# Patient Record
Sex: FEMALE | HISPANIC OR LATINO | Employment: FULL TIME | URBAN - METROPOLITAN AREA
[De-identification: names, ages, dates, MRNs, and addresses within clinical notes are randomized per-mention and may not be internally consistent; named-entity substitution may affect disease eponyms.]

---

## 2017-05-03 ENCOUNTER — NON-PROVIDER VISIT (OUTPATIENT)
Dept: URGENT CARE | Facility: PHYSICIAN GROUP | Age: 27
End: 2017-05-03

## 2017-05-03 DIAGNOSIS — Z02.1 PRE-EMPLOYMENT DRUG SCREENING: ICD-10-CM

## 2017-05-03 PROCEDURE — 8907 PR URINE COLLECT ONLY: Performed by: PHYSICIAN ASSISTANT

## 2018-04-09 ENCOUNTER — OCCUPATIONAL MEDICINE (OUTPATIENT)
Dept: URGENT CARE | Facility: CLINIC | Age: 28
End: 2018-04-09
Payer: COMMERCIAL

## 2018-04-09 VITALS
OXYGEN SATURATION: 96 % | RESPIRATION RATE: 16 BRPM | TEMPERATURE: 98.1 F | HEART RATE: 78 BPM | WEIGHT: 165.2 LBS | BODY MASS INDEX: 29.27 KG/M2 | DIASTOLIC BLOOD PRESSURE: 76 MMHG | HEIGHT: 63 IN | SYSTOLIC BLOOD PRESSURE: 124 MMHG

## 2018-04-09 DIAGNOSIS — L24.89 IRRITANT CONTACT DERMATITIS DUE TO OTHER AGENTS: ICD-10-CM

## 2018-04-09 PROCEDURE — 99202 OFFICE O/P NEW SF 15 MIN: CPT | Mod: 29 | Performed by: NURSE PRACTITIONER

## 2018-04-09 ASSESSMENT — ENCOUNTER SYMPTOMS
DIAPHORESIS: 0
FEVER: 0
WEAKNESS: 0
CHILLS: 0

## 2018-04-09 NOTE — LETTER
"EMPLOYEE’S CLAIM FOR COMPENSATION/ REPORT OF INITIAL TREATMENT  FORM C-4    EMPLOYEE’S CLAIM - PROVIDE ALL INFORMATION REQUESTED   First Name  Dee Last Name  Otoniel Birthdate                    1990                Sex  female Claim Number   Home Address  [address] Age  27 y.o. Height  1.6 m (5' 3\") Weight  74.9 kg (165 lb 3.2 oz) [facility]     [facility] Zip  [address] Telephone  [phone] (home)    Mailing Address  [address] [facility] Zip  [address] Primary Language Spoken  English    Insurer  [employer] Third Party   [employer]   Employee's Occupation (Job Title) When Injury or Occupational Disease Occurred      Employer's Name  [employer]  Telephone  [phone]    Employer Address  [address]  [facility]  Zip  [address]    Date of Injury  4/9/2018               Hour of Injury  10:00 AM Date Employer Notified  4/9/2018 Last Day of Work after Injury or Occupational Disease  4/9/2018 Supervisor to Whom Injury Reported  Bud   Address or Location of Accident (if applicable)  [[place]]   What were you doing at the time of accident? (if applicable)  working    How did this injury or occupational disease occur? (Be specific an answer in detail. Use additional sheet if necessary)  contact with fiberfrax, gloves, etc. Rash on arms and hands and neck   If you believe that you have an occupational disease, when did you first have knowledge of the disability and it relationship to your employment?  n/a Witnesses to the Accident  Bud      Nature of Injury or Occupational Disease  Dermatitis  Part(s) of Body Injured or Affected  Hand (L), Hand (R), Lower Arm (L)    I certify that the above is true and correct to the best of my knowledge and that I have provided this information in order to obtain the benefits of [place] " [place] inclusive or [place]).  I hereby authorize any physician, chiropractor, surgeon, practitioner, or other person, any hospital, including [facility] or [facility] hospital, any medical service organization, any insurance company, or other institution or organization to release to each other, any medical or other information, including benefits paid or payable, pertinent to this injury or disease, except information relative to diagnosis, treatment and/or counseling for AIDS, psychological conditions, alcohol or controlled substances, for which I must give specific authorization.  A Photostat of this authorization shall be as valid as the original.     Date 4/9/18   [facility]   Employee’s Signature   THIS REPORT MUST BE COMPLETED AND MAILED WITHIN 3 WORKING DAYS OF TREATMENT   Place  [facility]  Name of Facility  [facility]   Date  4/9/2018 Diagnosis  (L24.89) Irritant contact dermatitis due to other agents Is there evidence the injured employee was under the influence of alcohol and/or another controlled substance at the time of accident?   Hour  10:49 AM Description of Injury or Disease  The encounter diagnosis was Irritant contact dermatitis due to other agents. No   Treatment  OTC cortisone cream TID for 1 week.  Trial work in a different work station with no direct exposure to fracking materials.  Have you advised the patient to remain off work five days or more? No   X-Ray Findings      If Yes   From Date  To Date      From information given by the employee, together with medical evidence, can you directly connect this injury or occupational disease as job incurred?  Yes If No Full Duty  Yes Modified Duty      Is additional medical care by a physician indicated?  Yes  Comments:Follow up in 1 week. If Modified Duty, Specify any Limitations / Restrictions  Trial work in a different station.   Do you  "know of any previous injury or disease contributing to this condition or occupational disease?                            No   Date  4/9/2018 Print Doctor’s Name FAYE Lee I certify the employer’s copy of  this form was mailed on:   Address  [address] Insurer’s Use Only     [facility] Zip  [address]    Provider’s Tax ID Number  [id number] Telephone  Dept: [phone]        e-MICAELA Larson   e-Signature: Dr. Bandar Elkins, Medical Director Degree  APRN        ORIGINAL-TREATING PHYSICIAN OR CHIROPRACTOR    PAGE 2-INSURER/TPA    PAGE 3-EMPLOYER    PAGE 4-EMPLOYEE             Form C-4 (rev10/07)              BRIEF DESCRIPTION OF RIGHTS AND BENEFITS  (Pursuant to NRS 616C.050)    Notice of Injury or Occupational Disease (Incident Report Form C-1): If an injury or occupational disease (OD) arises out of and in the  course of employment, you must provide written notice to your employer as soon as practicable, but no later than 7 days after the accident or  OD. Your employer shall maintain a sufficient supply of the required forms.    Claim for Compensation (Form C-4): If medical treatment is sought, the form C-4 is available at the place of initial treatment. A completed  \"Claim for Compensation\" (Form C-4) must be filed within 90 days after an accident or OD. The treating physician or chiropractor must,  within 3 working days after treatment, complete and mail to the employer, the employer's insurer and third-party , the Claim for  Compensation.    Medical Treatment: If you require medical treatment for your on-the-job injury or OD, you may be required to select a physician or  chiropractor from a list provided by your workers’ compensation insurer, if it has contracted with an Organization for Managed Care (MCO) or  Preferred Provider Organization (PPO) or providers of health care. If your employer has not entered into a contract with an MCO or " PPO, you  may select a physician or chiropractor from the Panel of Physicians and Chiropractors. Any medical costs related to your industrial injury or  OD will be paid by your insurer.    Temporary Total Disability (TTD): If your doctor has certified that you are unable to work for a period of at least 5 consecutive days, or 5  cumulative days in a 20-day period, or places restrictions on you that your employer does not accommodate, you may be entitled to TTD  compensation.    Temporary Partial Disability (TPD): If the wage you receive upon reemployment is less than the compensation for TTD to which you are  entitled, the insurer may be required to pay you TPD compensation to make up the difference. TPD can only be paid for a maximum of 24  months.    Permanent Partial Disability (PPD): When your medical condition is stable and there is an indication of a PPD as a result of your injury or  OD, within 30 days, your insurer must arrange for an evaluation by a rating physician or chiropractor to determine the degree of your PPD. The  amount of your PPD award depends on the date of injury, the results of the PPD evaluation and your age and wage.    Permanent Total Disability (PTD): If you are medically certified by a treating physician or chiropractor as permanently and totally disabled  and have been granted a PTD status by your insurer, you are entitled to receive monthly benefits not to exceed 66 2/3% of your average  monthly wage. The amount of your PTD payments is subject to reduction if you previously received a PPD award.    Vocational Rehabilitation Services: You may be eligible for vocational rehabilitation services if you are unable to return to the job due to a  permanent physical impairment or permanent restrictions as a result of your injury or occupational disease.    Transportation and Per Armando Reimbursement: You may be eligible for travel expenses and per armando associated with medical  treatment.    Reopening: You may be able to reopen your claim if your condition worsens after claim closure.    Appeal Process: If you disagree with a written determination issued by the insurer or the insurer does not respond to your request, you may  appeal to the Department of Administration, , by following the instructions contained in your determination letter. You must  appeal the determination within 70 days from the date of the determination letter at [address], [address], [place], [place]  [address], or [address], [place], [place] [address]. If you disagree with the  decision, you may appeal to the  Department of Administration, . You must file your appeal within 30 days from the date of the  decision  letter at [address], [address], [place], [place] [address], or [address], [place], [place] [address]. If you  disagree with a decision of an , you may file a petition for judicial review with the District Court. You must do so within 30  days of the Appeal Officer’s decision. You may be represented by an  at your own expense or you may contact the [facility] for possible  representation.    [place]  for Injured Workers (NAIW): If you disagree with a  decision, you may request that NAIW represent you  without charge at an  Hearing. For information regarding denial of benefits, you may contact the [facility] at: [address], [place] [address], [phone], or [address], [address], [place] [address], [phone]    To File a Complaint with the Division: If you wish to file a complaint with the  of the Division of Industrial Relations (DIR),  please contact the Workers’ Compensation Section, [address], [address], [place], [place] [address], telephone (875) [phone], or  [address], [place], [place] [address], telephone [phone]) [phone].    For assistance with Workers’ Compensation Issues: you may contact the Office of the Governor Consumer Health Assistance, [address] [place] [address], [address], [place], [place] [address], Toll Free 1-769.488.2654, Web site: http://[employer].Counts include 234 beds at the [facility].[place]., E-mail  [email or web address] include 234 beds at the [facility].[place].                                                                                                                                                                                                                                   __________________________________________________________________                                                                   ______4/9/18___________                Employee Name / Signature                                                                                                                                                       Date                                                                                                                                                                                                     D-2 (rev. 10/07)

## 2018-04-09 NOTE — LETTER
[facility]  [address], [address] [address]  Phone:  [phone] - Fax:  [phone]   [facility] Progress Report and Disability Certification  Date of Service: 4/9/2018   No Show:  No  Date / Time of Next Visit: 4/16/2018   Claim Information   Patient Name: Dee Frederick  Claim Number:     Employer: [employer]  Date of Injury: 4/9/2018     Insurer / TPA: [employer]  ID / SSN:     Occupation:   Diagnosis: The encounter diagnosis was Irritant contact dermatitis due to other agents.    Medical Information   Related to Industrial Injury? Yes    Subjective Complaints:  1st visit.  Patient reports new onset of a red, pruritic rash on her wrists, forearms, and anterior neck x 3 weeks.  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  She does wear gloves, but notes that fibers do contact the exposed forearms and neck. She wears a variety of gloves (blue, black and teal) and changes her gloves every 30 minutes for the duration of her 12 hour shift. She has tried OTC cortisone occasionally and reports that the itching and the rash is lighter and smaller when she uses the cortisone.  He does have a history of eczema.  Denies any recreational activity or second job as potential source or rash.  Another co-worker from the same department is also being seen for similar symptoms.      Objective Findings: Patient is alert, oriented, and in no acute distress.  Erythematous patchy rash on the dorsal aspect of the 1st metacarpal region of the right hand, bilateral wrists, ventral aspect of the forearms, and anterior, left side of the neck.  Rash on the right hand has a light scale.  Some excoriations on the forearms likely due to scratching.  No evidence of secondary skin infection.  No vesicles, fluctuance, purulence, or weeping.  No TTP.  Non-pruritic presently.  No other rashes  noted.     Pre-Existing Condition(s):     Assessment:   Initial Visit    Status: Additional Care Required  Comments:Follow up in 1 week.  Permanent Disability:No    Plan: Medication  Comments:OTC cortisone cream TID for 1 week.    Diagnostics:      Comments:       Disability Information   Status: Released to Full Duty    From:  4/9/2018  Through: 4/16/2018 Restrictions are:     Physical Restrictions   Sitting:    Standing:    Stooping:    Bending:      Squatting:    Walking:    Climbing:    Pushing:      Pulling:    Other:    Reaching Above Shoulder (L):   Reaching Above Shoulder (R):       Reaching Below Shoulder (L):    Reaching Below Shoulder (R):      Not to exceed Weight Limits   Carrying(hrs):   Weight Limit(lb):   Lifting(hrs):   Weight  Limit(lb):     Comments: Trial full duty work with re-location to a different work station with no direct exposure to fracking materials.      Repetitive Actions   Hands: i.e. Fine Manipulations from Grasping:     Feet: i.e. Operating Foot Controls:     Driving / Operate Machinery:     Physician Name: FAYE Lee Physician Signature: MICAELA Dunbar e-Signature: Dr. Bandar Elkins, Medical Director   Clinic Name / Location: [address], [address]  [place] [address] Clinic Phone Number: Dept: [phone]   Appointment Time: 10:45 Am Visit Start Time: 10:49 AM   Check-In Time:  10:45 Am Visit Discharge Time:  11:29 AM   Original-Treating Physician or Chiropractor    Page 2-Insurer/TPA    Page 3-Employer    Page 4-Employee

## 2018-04-16 ENCOUNTER — OCCUPATIONAL MEDICINE (OUTPATIENT)
Dept: URGENT CARE | Facility: CLINIC | Age: 28
End: 2018-04-16
Payer: COMMERCIAL

## 2018-04-16 VITALS
DIASTOLIC BLOOD PRESSURE: 64 MMHG | HEART RATE: 70 BPM | BODY MASS INDEX: 29.23 KG/M2 | TEMPERATURE: 97.6 F | WEIGHT: 165 LBS | SYSTOLIC BLOOD PRESSURE: 98 MMHG | OXYGEN SATURATION: 96 % | HEIGHT: 63 IN | RESPIRATION RATE: 16 BRPM

## 2018-04-16 DIAGNOSIS — L24.89 IRRITANT CONTACT DERMATITIS DUE TO OTHER AGENTS: ICD-10-CM

## 2018-04-16 PROCEDURE — 99213 OFFICE O/P EST LOW 20 MIN: CPT | Mod: 29 | Performed by: NURSE PRACTITIONER

## 2018-04-16 ASSESSMENT — ENCOUNTER SYMPTOMS
TINGLING: 0
SENSORY CHANGE: 0

## 2018-04-16 NOTE — PROGRESS NOTES
"Subjective:      Dee Frederick is a 27 y.o. female who presents with Rash (rash better since last visit )      2nd visit.  Patient reported red, pruritic rash on her wrists, forearms, and anterior neck x 3 weeks.  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  After initial consult last week, she was advised to trial work in a different area with no direct contact with presumed irritant.  She applied OTC cortisone and essential oil to the rash and it resolved.  She currently denies any new rash, persistent lesions, or itching.  She does have a history of eczema.  Denies any recreational activity or second job as potential source or rash.       HPI    Review of Systems   Skin: Negative for itching and rash.   Neurological: Negative for tingling and sensory change.     Medications, Allergies, and current problem list reviewed today in Epic     Objective:     BP (!) 98/64   Pulse 70   Temp 36.4 °C (97.6 °F)   Resp 16   Ht 1.6 m (5' 3\")   Wt 74.8 kg (165 lb)   SpO2 96%   BMI 29.23 kg/m²      Physical Exam    Patient is alert, oriented, and in no acute distress.  Rash previously noted on the right hand, wrists, and neck has resolved.  No new lesions.  NO evidence of poor healing or secondary bacterial infection.       Assessment/Plan:     1. Irritant contact dermatitis due to other agents    Discharged MMI.  Patient verbalized understanding of and agreed with plan of care.      "

## 2018-04-16 NOTE — LETTER
[facility]  [address] [address]  Phone:  [phone] - Fax:  [phone]   [facility] Progress Report and Disability Certification  Date of Service: 4/16/2018   No Show:  No  Date / Time of Next Visit:     Claim Information   Patient Name: Dee Frederick  Claim Number:     Employer: [employer]  Date of Injury: 4/9/2018     Insurer / TPA: [employer]  ID / SSN:     Occupation:   Diagnosis: The encounter diagnosis was Irritant contact dermatitis due to other agents.    Medical Information   Related to Industrial Injury? Yes    Subjective Complaints:  2nd visit.  Patient reported red, pruritic rash on her wrists, forearms, and anterior neck x 3 weeks.  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  After initial consult last week, she was advised to trial work in a different area with no direct contact with presumed irritant.  She applied OTC cortisone and essential oil to the rash and it resolved.  She currently denies any new rash, persistent lesions, or itching.  She does have a history of eczema.  Denies any recreational activity or second job as potential source or rash.     Objective Findings: Patient is alert, oriented, and in no acute distress.  Rash previously noted on the right hand, wrists, and neck has resolved.  No new lesions.  NO evidence of poor healing or secondary bacterial infection.   Pre-Existing Condition(s):     Assessment:   Condition Improved    Status: Discharged /  MMI  Permanent Disability:No    Plan:      Diagnostics:      Comments:       Disability Information   Status: Released to Full Duty    From:     Through:   Restrictions are:     Physical Restrictions   Sitting:    Standing:    Stooping:    Bending:      Squatting:    Walking:    Climbing:    Pushing:      Pulling:    Other:    Reaching Above Shoulder (L):   Reaching Above  Shoulder (R):       Reaching Below Shoulder (L):    Reaching Below Shoulder (R):      Not to exceed Weight Limits   Carrying(hrs):   Weight Limit(lb):   Lifting(hrs):   Weight  Limit(lb):     Comments: If patient returns to work in [employer] station and symptoms return, consider permanent re-assigment to a different department to avoid exposure to contact irritant.      Repetitive Actions   Hands: i.e. Fine Manipulations from Grasping:     Feet: i.e. Operating Foot Controls:     Driving / Operate Machinery:     Physician Name: FAYE Lee Physician Signature: MICAELA Dunbar e-Signature: Dr. Bandar Elkins, Medical Director   Clinic Name / Location: [address], [facility] [address]  [place] [address] Clinic Phone Number: Dept: [phone]   Appointment Time: 11:45 Am Visit Start Time: 11:48 AM   Check-In Time:  11:46 Am Visit Discharge Time:  11:55 AM   Original-Treating Physician or Chiropractor    Page 2-Insurer/TPA    Page 3-Employer    Page 4-Employee

## 2018-10-25 ENCOUNTER — GYNECOLOGY VISIT (OUTPATIENT)
Dept: OBGYN | Facility: CLINIC | Age: 28
End: 2018-10-25
Payer: COMMERCIAL

## 2018-10-25 VITALS
BODY MASS INDEX: 30.65 KG/M2 | DIASTOLIC BLOOD PRESSURE: 72 MMHG | HEIGHT: 63 IN | WEIGHT: 173 LBS | SYSTOLIC BLOOD PRESSURE: 114 MMHG

## 2018-10-25 DIAGNOSIS — N92.6 MISSED MENSES: ICD-10-CM

## 2018-10-25 DIAGNOSIS — Z32.01 PREGNANCY TEST-POSITIVE: ICD-10-CM

## 2018-10-25 PROCEDURE — 76830 TRANSVAGINAL US NON-OB: CPT | Performed by: OBSTETRICS & GYNECOLOGY

## 2018-10-25 PROCEDURE — 99204 OFFICE O/P NEW MOD 45 MIN: CPT | Mod: 25 | Performed by: OBSTETRICS & GYNECOLOGY

## 2018-10-25 NOTE — PROGRESS NOTES
CC: Missed menses    Dee Frederick is a 27 y.o. . who presents presents due to missed menses. LMP 18. Reports she first had a positive pregnancy test on 19 and is thus is 13w6d based off LMP. She was not using anything for birthcontrol at time of conception as she had just had the Nexplanon removed.  This is a  planned and desired pregnancy.   Reports she has been feeling tired and nauseous thus far in pregnancy. She reports nausea/vomiting, denies headache, denies dysuria, denies  vaginal bleeding/spotting, and denies contractions/cramping.    Partner: [place] - different father than prior pregnancy    Review of systems:  Pertinent positives documented in HPI and all other systems reviewed & are negative    GYN History:  Menarche @14.  Menses regular, lasting 3-4days, not particularly heavy or painful.  Last pap many years ago, no h/o abnormal pap, nor history of cone biopsy, LEEP or any other cervical, uterine or gynecologic surgery. No history of sexually transmitted diseases.  Prior birth control OCPs, depo, Nexplanon, most recently was using Nexplanon but was taken out in July.        OB History    Para Term  AB Living   1 1 1         SAB TAB Ectopic Molar Multiple Live Births                    # Outcome Date GA Lbr Basil/2nd Weight Sex Delivery Anes PTL Lv   1 Term 03/19/10 41w0d  3.884 kg (8 lb 9 oz) M Induction             History reviewed. No pertinent past medical history. Patient denies any PMH/PSH  History reviewed. No pertinent surgical history.  Social History     Social History   • Marital status: Single     Spouse name: N/A   • Number of children: N/A   • Years of education: N/A     Occupational History   • Not on file.     Social History Main Topics   • Smoking status: Never Smoker   • Smokeless tobacco: Never Used   • Alcohol use No   • Drug use: No   • Sexual activity: Yes     Other Topics Concern   • Not on file     Social History Narrative   • No narrative on file  "    History reviewed. No pertinent family history.  Allergies:   Allergies as of 10/25/2018   • (No Known Allergies)       Current Outpatient Prescriptions:   •  Prenatal Vit-Fe Fumarate-FA (PRENATAL 1+1 PO), Take  by mouth., Disp: , Rfl:       PE:    Blood pressure 114/72, height 1.6 m (5' 3\"), weight 78.5 kg (173 lb).    General: appears stated age  Head: normocephalic, non-tender  Neck: neck is supple, no thyromegaly  Abdomen: Bowel sounds positive, nondistended, soft, nontender x4, no rebound or guarding. No organomegaly. No masses. fundus not yet palpable  Female GYN: normal female external genitalia without lesions  Skin: No rashes, or ulcers or lesions seen  Psychiatric: appropriate affect, alert and oriented x3, intact judgment and insight.    Transvaginal US performed by me and per my read:    Indication: confirm dates.     Findings: araujo intrauterine pregnancy @ 15w3d by biometry.  BPD 3.4 cm  HC 11.6 cm  AC 9.6 cm  FL 1.6 cm   Fetal position variable  Placenta posterior  Positive fetal cardiac activity @ 150 BPM.  EDC by US of 4/15/19       Right ovary not seen. Left Ovary WNL.   No free fluid in the cul-de-sac.    Pregnancy exam/test positive    A/P:   27 y.o.  history on file. here for  1. Pregnancy test-positive     2. Missed menses       #Missed menses - amenorrhea due to pregnancy.  US today is not c/w LMP thus will redate based off this US giving URIAH of 4/15/19.  Discussed pregnancy with patient who is excited.    --Normal pregnancy s/s discussed  --Advised prenatal vitamins, healthy well rounded diet, adequate hydration, and continued exercise.    #Cervical cancer screening.  Last pap many years ago will need pap at NOB visit.    #Will order prenatal labs and any additional imaging/testing needed at new OB visit  #SAB precautions discussed.  #Follow up in 2-4 weeks for new OB visit.    45 minutes were spent in face-to-face patient contact with patient, greater than 50% of which was was " spent in counseling and coordination of care for her newly diagnosed pregnancy including medical and surgical options of care.    YOGESH

## 2018-11-29 ENCOUNTER — HOSPITAL ENCOUNTER (OUTPATIENT)
Facility: MEDICAL CENTER | Age: 28
End: 2018-11-29
Attending: OBSTETRICS & GYNECOLOGY
Payer: COMMERCIAL

## 2018-11-29 ENCOUNTER — INITIAL PRENATAL (OUTPATIENT)
Dept: OBGYN | Facility: CLINIC | Age: 28
End: 2018-11-29
Payer: COMMERCIAL

## 2018-11-29 DIAGNOSIS — Z34.02 ENCOUNTER FOR SUPERVISION OF NORMAL FIRST PREGNANCY IN SECOND TRIMESTER: ICD-10-CM

## 2018-11-29 PROCEDURE — 88175 CYTOPATH C/V AUTO FLUID REDO: CPT

## 2018-11-29 PROCEDURE — 87591 N.GONORRHOEAE DNA AMP PROB: CPT

## 2018-11-29 PROCEDURE — 87491 CHLMYD TRACH DNA AMP PROBE: CPT

## 2018-11-29 PROCEDURE — 59401 PR NEW OB VISIT: CPT | Performed by: OBSTETRICS & GYNECOLOGY

## 2018-11-29 PROCEDURE — 90686 IIV4 VACC NO PRSV 0.5 ML IM: CPT | Performed by: OBSTETRICS & GYNECOLOGY

## 2018-11-29 PROCEDURE — 90471 IMMUNIZATION ADMIN: CPT | Performed by: OBSTETRICS & GYNECOLOGY

## 2018-11-29 NOTE — PROGRESS NOTES
Cc: No chief complaint on file.      HPI:  The patient is a 28 y.o.  Unknown based upon first TM US. URIAH is 4/15/2019. No LMP recorded. Feeling well. Admits to fetal movement.    She presents for her new obstetric visit.    She reports fetal movement, denies vaginal bleeding, denies leakage of fluid, denies contractions.     She denies nausea/vomiting, headaches, or urinary symptoms.      OB History    Para Term  AB Living   1 1 1         SAB TAB Ectopic Molar Multiple Live Births                    # Outcome Date GA Lbr Basil/2nd Weight Sex Delivery Anes PTL Lv   1 Term 03/19/10 41w0d  3.884 kg (8 lb 9 oz) M Induction           No past medical history on file.  No past surgical history on file.  Social History     Social History   • Marital status: Single     Spouse name: N/A   • Number of children: N/A   • Years of education: N/A     Occupational History   • Not on file.     Social History Main Topics   • Smoking status: Never Smoker   • Smokeless tobacco: Never Used   • Alcohol use No   • Drug use: No   • Sexual activity: Yes     Other Topics Concern   • Not on file     Social History Narrative   • No narrative on file     No family history on file.  Allergies:   Allergies as of 2018   • (No Known Allergies)       PE:    There were no vitals taken for this visit.    General: no apparent distress, is well developed and well nourished  Head: normocephalic, atraumatic  Neck: neck is supple, non-tender, no thyromegaly, full range of motion  CVS: regular rate and rhythm, no peripheral edema  Lungs: Normal respiratory effort. Clear to auscultation bilaterally  Abdomen: Bowel sounds positive, nondistended, soft, nontender x4, no rebound or guarding.  Female GYN: normal female external genitalia without lesions  normal vagina and normal vaginal tone, normal cervix, normal adnexa without tenderness  Skin: No rashes, or ulcers or lesions seen  Psychiatric: Patient shows appropriate affect, is alert  and oriented x3, intact judgment and insight.    DATING: DUB appt US not consistent with LMP thus will use second TM US to date pregnancy. URIAH = 4/15/2019    A/P:  28 y.o.  at 20-3weeks based on second TM ultrasound.  She is here for her new obstetric visit.      1. Encounter for supervision of normal first pregnancy in second trimester       Pap smear taken today with GC/CT  Ultrasound for dates and anatomy Rx today.  Second trimester screening for Down Syndrome and neural tube defects offered. Discussed SM, CF, and Hg electrophoresis screening with patient - advised to call insurance for cost of testing. Patient agrees to get Quad screening.  New prenatal labs today.  NOB packet given with ACOG prenatal book.  Increase water intake and encouraged healthy nutrition. Encouraged moderate exercise may continue into final trimester.   Referral to MFM not needed.  Continue prenatal vitamins.  Follow-up in 4 weeks.   SAB precautions educated.  Oriented to practice model and number of expected visits in the future.  Advised to get flu vaccine during flu season

## 2018-11-29 NOTE — PROGRESS NOTES
NOB  Pt denies any leaking fluids or vaginal blood  Flu vaccine Given today    ND: [id number]  LOT#: [id number]  Expiration Date: 19    Dose: 0.5mL  Site: Right Arm    Patient educated on use and side effects of medication. Name and  verified prior to injection. Pt tolerated? YES     Verified by JUANIS MORENO    Administered by Alyssa Tate at 2:39 PM.    Patient Provided Medication: NO

## 2018-11-30 ENCOUNTER — TELEPHONE (OUTPATIENT)
Dept: OBGYN | Facility: CLINIC | Age: 28
End: 2018-11-30

## 2018-12-01 LAB
C TRACH DNA GENITAL QL NAA+PROBE: NEGATIVE
CYTOLOGY REG CYTOL: NORMAL
N GONORRHOEA DNA GENITAL QL NAA+PROBE: NEGATIVE
SPECIMEN SOURCE: NORMAL

## 2018-12-22 ENCOUNTER — HOSPITAL ENCOUNTER (OUTPATIENT)
Dept: RADIOLOGY | Facility: MEDICAL CENTER | Age: 28
End: 2018-12-22
Attending: OBSTETRICS & GYNECOLOGY
Payer: COMMERCIAL

## 2018-12-22 DIAGNOSIS — Z34.02 ENCOUNTER FOR SUPERVISION OF NORMAL FIRST PREGNANCY IN SECOND TRIMESTER: ICD-10-CM

## 2018-12-22 PROCEDURE — 76811 OB US DETAILED SNGL FETUS: CPT

## 2018-12-26 ENCOUNTER — DOCUMENTATION (OUTPATIENT)
Dept: OBGYN | Facility: MEDICAL CENTER | Age: 28
End: 2018-12-26

## 2018-12-27 ENCOUNTER — ROUTINE PRENATAL (OUTPATIENT)
Dept: OBGYN | Facility: MEDICAL CENTER | Age: 28
End: 2018-12-27
Payer: COMMERCIAL

## 2018-12-27 VITALS
BODY MASS INDEX: 33.68 KG/M2 | DIASTOLIC BLOOD PRESSURE: 68 MMHG | SYSTOLIC BLOOD PRESSURE: 108 MMHG | WEIGHT: 190.15 LBS

## 2018-12-27 DIAGNOSIS — Z34.82 ENCOUNTER FOR SUPERVISION OF OTHER NORMAL PREGNANCY IN SECOND TRIMESTER: ICD-10-CM

## 2018-12-27 PROBLEM — Z34.90 SUPERVISION OF NORMAL PREGNANCY: Status: ACTIVE | Noted: 2018-12-27

## 2018-12-27 PROCEDURE — 90040 PR PRENATAL FOLLOW UP: CPT | Performed by: OBSTETRICS & GYNECOLOGY

## 2018-12-27 NOTE — PROGRESS NOTES
S: Pt presents for routine OB follow up.  No contractions, vaginal bleeding, or leaking fluids. Good fetal movement.  Has not had her prenatal labs done yet but will do so asap. Now too late for the AFP to be done.  Questions answered.    O: /68   Wt 86.3 kg (190 lb 2.4 oz)   BMI 33.68 kg/m²   Patients' weight gain, fluid intake and exercise level discussed.  Vitals, fundal height , fetal position, and FHR reviewed on flowsheet    Lab:No results found for this or any previous visit (from the past 336 hour(s)).    Prenatal labs:  T&S: O+  Pap smear: NILM     Level II: normal anatomy, S=D  S/p flu vaccine     A/P:  28 y.o.  at 24w3d based on LMP and confirmed by fist TM US. No LMP recorded. Patient is pregnant. presents for routine obstetric follow-up.  Size equals dates and/or scan  Glucose 1 hour given today  Prenatal panel given today  Continue prenatal vitamins.  Begin kick counts at 28 weeks.  Exercise at least 30 minutes daily.  Increase water intake.  Follow-up in 4 weeks.

## 2018-12-27 NOTE — PROGRESS NOTES
Pt here today for OB follow up  Pt states no complaints  Reports +  Joaquin # [phone]  Pharmacy Confirmed.

## 2019-01-16 ENCOUNTER — HOSPITAL ENCOUNTER (OUTPATIENT)
Dept: LAB | Facility: MEDICAL CENTER | Age: 29
End: 2019-01-16
Attending: OBSTETRICS & GYNECOLOGY
Payer: COMMERCIAL

## 2019-01-16 DIAGNOSIS — Z34.82 ENCOUNTER FOR SUPERVISION OF OTHER NORMAL PREGNANCY IN SECOND TRIMESTER: ICD-10-CM

## 2019-01-16 DIAGNOSIS — Z34.02 ENCOUNTER FOR SUPERVISION OF NORMAL FIRST PREGNANCY IN SECOND TRIMESTER: ICD-10-CM

## 2019-01-16 LAB
ABO GROUP BLD: NORMAL
APPEARANCE UR: ABNORMAL
BACTERIA #/AREA URNS HPF: ABNORMAL /HPF
BASOPHILS # BLD AUTO: 0.2 % (ref 0–1.8)
BASOPHILS # BLD: 0.02 K/UL (ref 0–0.12)
BILIRUB UR QL STRIP.AUTO: NEGATIVE
BLD GP AB SCN SERPL QL: NORMAL
COLOR UR: YELLOW
EOSINOPHIL # BLD AUTO: 0.2 K/UL (ref 0–0.51)
EOSINOPHIL NFR BLD: 1.7 % (ref 0–6.9)
EPI CELLS #/AREA URNS HPF: ABNORMAL /HPF
ERYTHROCYTE [DISTWIDTH] IN BLOOD BY AUTOMATED COUNT: 43.3 FL (ref 35.9–50)
GLUCOSE 1H P 50 G GLC PO SERPL-MCNC: 165 MG/DL (ref 70–139)
GLUCOSE UR STRIP.AUTO-MCNC: NEGATIVE MG/DL
HBV SURFACE AG SER QL: NEGATIVE
HCT VFR BLD AUTO: 33.6 % (ref 37–47)
HGB BLD-MCNC: 11.5 G/DL (ref 12–16)
HIV 1+2 AB+HIV1 P24 AG SERPL QL IA: NON REACTIVE
HYALINE CASTS #/AREA URNS LPF: ABNORMAL /LPF
IMM GRANULOCYTES # BLD AUTO: 0.06 K/UL (ref 0–0.11)
IMM GRANULOCYTES NFR BLD AUTO: 0.5 % (ref 0–0.9)
KETONES UR STRIP.AUTO-MCNC: NEGATIVE MG/DL
LEUKOCYTE ESTERASE UR QL STRIP.AUTO: ABNORMAL
LYMPHOCYTES # BLD AUTO: 1.57 K/UL (ref 1–4.8)
LYMPHOCYTES NFR BLD: 13.3 % (ref 22–41)
MCH RBC QN AUTO: 33 PG (ref 27–33)
MCHC RBC AUTO-ENTMCNC: 34.2 G/DL (ref 33.6–35)
MCV RBC AUTO: 96.3 FL (ref 81.4–97.8)
MICRO URNS: ABNORMAL
MONOCYTES # BLD AUTO: 0.69 K/UL (ref 0–0.85)
MONOCYTES NFR BLD AUTO: 5.8 % (ref 0–13.4)
NEUTROPHILS # BLD AUTO: 9.3 K/UL (ref 2–7.15)
NEUTROPHILS NFR BLD: 78.5 % (ref 44–72)
NITRITE UR QL STRIP.AUTO: NEGATIVE
NRBC # BLD AUTO: 0 K/UL
NRBC BLD-RTO: 0 /100 WBC
PH UR STRIP.AUTO: 6 [PH]
PLATELET # BLD AUTO: 311 K/UL (ref 164–446)
PMV BLD AUTO: 10.3 FL (ref 9–12.9)
PROT UR QL STRIP: NEGATIVE MG/DL
RBC # BLD AUTO: 3.49 M/UL (ref 4.2–5.4)
RBC # URNS HPF: ABNORMAL /HPF
RBC UR QL AUTO: NEGATIVE
RH BLD: NORMAL
RUBV AB SER QL: 266 IU/ML
SP GR UR STRIP.AUTO: 1.02
TREPONEMA PALLIDUM IGG+IGM AB [PRESENCE] IN SERUM OR PLASMA BY IMMUNOASSAY: NON REACTIVE
TREPONEMA PALLIDUM IGG+IGM AB [PRESENCE] IN SERUM OR PLASMA BY IMMUNOASSAY: NON REACTIVE
UROBILINOGEN UR STRIP.AUTO-MCNC: 0.2 MG/DL
WBC # BLD AUTO: 11.8 K/UL (ref 4.8–10.8)
WBC #/AREA URNS HPF: ABNORMAL /HPF

## 2019-01-16 PROCEDURE — 36415 COLL VENOUS BLD VENIPUNCTURE: CPT

## 2019-01-16 PROCEDURE — 86850 RBC ANTIBODY SCREEN: CPT

## 2019-01-16 PROCEDURE — 86901 BLOOD TYPING SEROLOGIC RH(D): CPT

## 2019-01-16 PROCEDURE — 87340 HEPATITIS B SURFACE AG IA: CPT

## 2019-01-16 PROCEDURE — 81511 FTL CGEN ABNOR FOUR ANAL: CPT

## 2019-01-16 PROCEDURE — 87389 HIV-1 AG W/HIV-1&-2 AB AG IA: CPT

## 2019-01-16 PROCEDURE — 86900 BLOOD TYPING SEROLOGIC ABO: CPT

## 2019-01-16 PROCEDURE — 86780 TREPONEMA PALLIDUM: CPT

## 2019-01-16 PROCEDURE — 86762 RUBELLA ANTIBODY: CPT

## 2019-01-16 PROCEDURE — 85025 COMPLETE CBC W/AUTO DIFF WBC: CPT

## 2019-01-16 PROCEDURE — 81001 URINALYSIS AUTO W/SCOPE: CPT

## 2019-01-16 PROCEDURE — 82950 GLUCOSE TEST: CPT

## 2019-01-18 DIAGNOSIS — O09.93 ENCOUNTER FOR SUPERVISION OF HIGH RISK PREGNANCY IN THIRD TRIMESTER, ANTEPARTUM: ICD-10-CM

## 2019-01-20 LAB
# FETUSES US: NORMAL
AFP MOM SERPL: NORMAL
AFP SERPL-MCNC: 169 NG/ML
AGE - REPORTED: 27.4 YR
CURRENT SMOKER: NO
FAMILY MEMBER DISEASES HX: NO
GA METHOD: NORMAL
GA: NORMAL WK
HCG MOM SERPL: NORMAL
HCG SERPL-ACNC: NORMAL IU/L
HX OF HEREDITARY DISORDERS: NO
IDDM PATIENT QL: NO
INHIBIN A MOM SERPL: NORMAL
INHIBIN A SERPL-MCNC: 306 PG/ML
INTEGRATED SCN PATIENT-IMP: NORMAL
PATHOLOGY STUDY: NORMAL
SPECIMEN DRAWN SERPL: NORMAL
U ESTRIOL MOM SERPL: NORMAL
U ESTRIOL SERPL-MCNC: 3.12 NG/ML

## 2019-01-24 ENCOUNTER — ROUTINE PRENATAL (OUTPATIENT)
Dept: OBGYN | Facility: MEDICAL CENTER | Age: 29
End: 2019-01-24
Payer: COMMERCIAL

## 2019-01-24 VITALS — BODY MASS INDEX: 34.37 KG/M2 | DIASTOLIC BLOOD PRESSURE: 70 MMHG | SYSTOLIC BLOOD PRESSURE: 105 MMHG | WEIGHT: 194 LBS

## 2019-01-24 DIAGNOSIS — Z34.80 SUPERVISION OF OTHER NORMAL PREGNANCY, ANTEPARTUM: ICD-10-CM

## 2019-01-24 PROCEDURE — 90715 TDAP VACCINE 7 YRS/> IM: CPT | Performed by: OBSTETRICS & GYNECOLOGY

## 2019-01-24 PROCEDURE — 90040 PR PRENATAL FOLLOW UP: CPT | Performed by: OBSTETRICS & GYNECOLOGY

## 2019-01-24 PROCEDURE — 90471 IMMUNIZATION ADMIN: CPT | Performed by: OBSTETRICS & GYNECOLOGY

## 2019-01-24 NOTE — PROGRESS NOTES
Ob visit @ 28w3d.Doing well with active FM.Pt denies bleeding,leaking fluid, uc's or other c/o. Active FM. Pt is advised of elevated 1hr gtt she will be given lab slip for 3hr gtt.

## 2019-01-24 NOTE — PROGRESS NOTES
S: Pt presents for routine OB follow up.  No contractions, vaginal bleeding, or leaking fluids. Good fetal movement.    Questions answered.    O: There were no vitals taken for this visit.  Patients' weight gain, fluid intake and exercise level discussed.  Vitals, fundal height , fetal position, and FHR reviewed on flowsheet    SVE: deferred  FH: 135  Fundus: 29    Lab:  Recent Results (from the past 336 hour(s))   AFP TETRA    Collection Time: 01/16/19  1:04 PM   Result Value Ref Range    AFP Value -Eia 169 ng/mL    AFP MOM Value Not Done     Ue3 Value 3.12 ng/mL    Ue3 Mom Not Done     Patient's hCG, 2nd Trimester 62233 IU/L    hCG MoM, 2nd Trimester Not Done     Lynn Value -Eia 306 pg/mL    Lynn Mom Value Not Done     Interpretation See Note     [age] URIAH [age]    Maternal Weight 173.0 lbs.     Gest. Age on Collection Date 27 wks, 2 days     Gestational Age Based On Ultrasound     Multiple Pregnancy Ivan     Race Nonblack     Insulin Dependent Diabetes No     Smoking No     Family Hx NTD No     Family Hx of Aneuploidy No     Specimen See Note     EER Quad, Maternal Serum See Note    GLUCOSE 1HR GESTATIONAL    Collection Time: 01/16/19  1:04 PM   Result Value Ref Range    Glucose, Post Dose 165 (H) 70 - 139 mg/dL   T.PALLIDUM AB EIA    Collection Time: 01/16/19  1:04 PM   Result Value Ref Range    Syphilis, Treponemal Qual Non Reactive Non Reactive   PRENATAL PANEL 3+HIV+UACXI    Collection Time: 01/16/19  1:04 PM   Result Value Ref Range    Color Yellow     Character Cloudy (A)     Specific Gravity 1.017 <1.035    Ph 6.0 5.0 - 8.0    Glucose Negative Negative mg/dL    Ketones Negative Negative mg/dL    Protein Negative Negative mg/dL    Bilirubin Negative Negative    Urobilinogen, Urine 0.2 Negative    Nitrite Negative Negative    Leukocyte Esterase Trace (A) Negative    Occult Blood Negative Negative    Micro Urine Req Microscopic     WBC 11.8 (H) 4.8 - 10.8 K/uL    RBC 3.49 (L) 4.20 - 5.40 M/uL     Hemoglobin 11.5 (L) 12.0 - 16.0 g/dL    Hematocrit 33.6 (L) 37.0 - 47.0 %    MCV 96.3 81.4 - 97.8 fL    MCH 33.0 27.0 - 33.0 pg    MCHC 34.2 33.6 - 35.0 g/dL    RDW 43.3 35.9 - 50.0 fL    Platelet Count 311 164 - 446 K/uL    MPV 10.3 9.0 - 12.9 fL    Neutrophils-Polys 78.50 (H) 44.00 - 72.00 %    Lymphocytes 13.30 (L) 22.00 - 41.00 %    Monocytes 5.80 0.00 - 13.40 %    Eosinophils 1.70 0.00 - 6.90 %    Basophils 0.20 0.00 - 1.80 %    Immature Granulocytes 0.50 0.00 - 0.90 %    Nucleated RBC 0.00 /100 WBC    Neutrophils (Absolute) 9.30 (H) 2.00 - 7.15 K/uL    Lymphs (Absolute) 1.57 1.00 - 4.80 K/uL    Monos (Absolute) 0.69 0.00 - 0.85 K/uL    Eos (Absolute) 0.20 0.00 - 0.51 K/uL    Baso (Absolute) 0.02 0.00 - 0.12 K/uL    Immature Granulocytes (abs) 0.06 0.00 - 0.11 K/uL    NRBC (Absolute) 0.00 K/uL    Rubella IgG Antibody 266.00 IU/mL    Syphilis, Treponemal Qual Non Reactive Non Reactive    Hepatitis B Surface Antigen Negative Negative   HIV AG/AB COMBO ASSAY SCREENING    Collection Time: 19  1:04 PM   Result Value Ref Range    HIV Ag/Ab Combo Assay Non Reactive Non Reactive   OP PRENATAL PANEL-BLOOD BANK    Collection Time: 19  1:04 PM   Result Value Ref Range    ABO Grouping Only O     Rh Grouping Only POS     Antibody Screen Scrn NEG    URINE MICROSCOPIC (W/UA)    Collection Time: 19  1:04 PM   Result Value Ref Range    WBC 0-2 /hpf    RBC 0-2 /hpf    Bacteria Moderate (A) None /hpf    Epithelial Cells Moderate (A) /hpf    Hyaline Cast 0-2 /lpf       Prenatal labs:  T&S: O+/ ab neg  Hep B: negative  T.Pall: non reactive  HIV: non reactive  Pap smear: NILM neg GCCT 2018  Carrier screening:  NA  Quad: too late for testing  1 hour: 165   3 hour: not done yet    Level II: normal anatomy at 24 weeks. No previa. CL 3.9cm    A/P:  28 y.o.  at 28w3d based on 15 week US presents for routine obstetric follow-up.     - tdap today  - Delivery plan: vaginal  - Failed 1 hour: discussed  importance of getting 3 hour done  - Continue prenatal vitamins.  - Begin kick counts at 28 weeks.  - Exercise at least 30 minutes daily.  - Increase water intake.  - Follow-up in 2 weeks.

## 2019-01-31 ENCOUNTER — HOSPITAL ENCOUNTER (OUTPATIENT)
Dept: LAB | Facility: MEDICAL CENTER | Age: 29
End: 2019-01-31
Attending: OBSTETRICS & GYNECOLOGY
Payer: COMMERCIAL

## 2019-01-31 DIAGNOSIS — O09.93 ENCOUNTER FOR SUPERVISION OF HIGH RISK PREGNANCY IN THIRD TRIMESTER, ANTEPARTUM: ICD-10-CM

## 2019-01-31 LAB
GLUCOSE 1H P CHAL SERPL-MCNC: 133 MG/DL (ref 65–180)
GLUCOSE 2H P CHAL SERPL-MCNC: 135 MG/DL (ref 65–155)
GLUCOSE 3H P CHAL SERPL-MCNC: 115 MG/DL (ref 65–140)
GLUCOSE BS SERPL-MCNC: 99 MG/DL (ref 65–95)

## 2019-01-31 PROCEDURE — 36415 COLL VENOUS BLD VENIPUNCTURE: CPT

## 2019-01-31 PROCEDURE — 82951 GLUCOSE TOLERANCE TEST (GTT): CPT

## 2019-01-31 PROCEDURE — 82952 GTT-ADDED SAMPLES: CPT

## 2019-02-08 ENCOUNTER — ROUTINE PRENATAL (OUTPATIENT)
Dept: OBGYN | Facility: MEDICAL CENTER | Age: 29
End: 2019-02-08
Payer: COMMERCIAL

## 2019-02-08 VITALS — DIASTOLIC BLOOD PRESSURE: 62 MMHG | BODY MASS INDEX: 35.78 KG/M2 | SYSTOLIC BLOOD PRESSURE: 102 MMHG | WEIGHT: 202 LBS

## 2019-02-08 DIAGNOSIS — Z34.83 ENCOUNTER FOR SUPERVISION OF OTHER NORMAL PREGNANCY IN THIRD TRIMESTER: ICD-10-CM

## 2019-02-08 PROCEDURE — 90040 PR PRENATAL FOLLOW UP: CPT | Performed by: OBSTETRICS & GYNECOLOGY

## 2019-02-08 NOTE — PROGRESS NOTES
HUMBLE:  30w4d    Pt reports doing well.  Denies vaginal bleeding, contractions, LOF.  Reports +FM.    /62   Wt 91.6 kg (202 lb)   BMI 35.78 kg/m²   gen: AAO, NAD  FHTs: 130  FH: 31    A/P: 28 y.o.  @ 30w4d by 15wk US  - PNL up to date, Rh+/-, 3rd tri CBC/syphilis WNL, 1hr elevated - normal 3hr; anatomy US wnl  - s/p flu/Tdap  - discussed PP contraception, considering nexplanon vs mirena    Reviewed labor precautions (to call or come to hospital for ctx q5min, VB, LOF, decreased FM)    RTC 2wks    Radha Mcdaniel MD  [facility], Women's Health

## 2019-02-08 NOTE — PROGRESS NOTES
Pt here today for OB follow up  Pt states denies VB and LOF  Reports +FM   # verified  Pharmacy Confirmed.

## 2019-02-22 ENCOUNTER — ROUTINE PRENATAL (OUTPATIENT)
Dept: OBGYN | Facility: MEDICAL CENTER | Age: 29
End: 2019-02-22
Payer: COMMERCIAL

## 2019-02-22 VITALS — DIASTOLIC BLOOD PRESSURE: 74 MMHG | WEIGHT: 204 LBS | SYSTOLIC BLOOD PRESSURE: 125 MMHG | BODY MASS INDEX: 36.14 KG/M2

## 2019-02-22 DIAGNOSIS — Z34.83 ENCOUNTER FOR SUPERVISION OF OTHER NORMAL PREGNANCY IN THIRD TRIMESTER: ICD-10-CM

## 2019-02-22 PROCEDURE — 90040 PR PRENATAL FOLLOW UP: CPT | Performed by: OBSTETRICS & GYNECOLOGY

## 2019-02-22 NOTE — PROGRESS NOTES
Ob visit @ 32w4d.Pt doing well, active FM and no c/o today.Denies bleeding,leaking fluid, ha's or swelling. Flu and TDAP done

## 2019-02-22 NOTE — PROGRESS NOTES
S: Pt presents for routine OB follow up.  No contractions, vaginal bleeding, or leaking fluids. Good fetal movement.      O: /74   Wt 92.5 kg (204 lb)   BMI 36.14 kg/m²   Patients' weight gain, fluid intake and exercise level discussed.  Vitals, fundal height , fetal position, and FHR reviewed on flowsheet    SVE: deferred  FH: 150  Fundus: 34    Lab:No results found for this or any previous visit (from the past 336 hour(s)).    Prenatal labs:  T&S: O+/ ab neg  Hep B: negative  T.Pall: non reactive  HIV: non reactive  Pap smear: NILM neg GCCT 2018  Carrier screening:  NA  Quad: too late for testing  1 hour: 165   3 hour: 99/133/135/115 (passed)     Level II: normal anatomy at 24 weeks. No previa. CL 3.9cm    A/P:  28 y.o.  at 32w4d based on 15wk US presents for routine obstetric follow-up.   No LMP recorded. Patient is pregnant.  Size equals dates and/or scan  - s/p flu and tdap  - Delivery plan: anticipate vaginal with epidural.  - Postpartum prophylaxis: still considering nexplanon vs mirena. Wants to breastfeed  - Continue prenatal vitamins.  -  labor precautions and fetal kick counts   - Increase water intake.  - Follow-up in 2 weeks.

## 2019-03-08 ENCOUNTER — ROUTINE PRENATAL (OUTPATIENT)
Dept: OBGYN | Facility: MEDICAL CENTER | Age: 29
End: 2019-03-08
Payer: COMMERCIAL

## 2019-03-08 VITALS — BODY MASS INDEX: 36.67 KG/M2 | WEIGHT: 207 LBS | DIASTOLIC BLOOD PRESSURE: 70 MMHG | SYSTOLIC BLOOD PRESSURE: 122 MMHG

## 2019-03-08 DIAGNOSIS — O26.843 UTERINE SIZE-DATE DISCREPANCY IN THIRD TRIMESTER: ICD-10-CM

## 2019-03-08 DIAGNOSIS — Z34.83 ENCOUNTER FOR SUPERVISION OF OTHER NORMAL PREGNANCY IN THIRD TRIMESTER: Primary | ICD-10-CM

## 2019-03-08 PROCEDURE — 90040 PR PRENATAL FOLLOW UP: CPT | Performed by: NURSE PRACTITIONER

## 2019-03-08 NOTE — PROGRESS NOTES
Ob visit@ 34w4d.Pt doing well with no bleeding,leaking fluid or pain. Pt states active FM. Flu and tdap done. O+. Adequate hydration and po calories. Denies ha's, swelling or epigastric pain.

## 2019-03-08 NOTE — PROGRESS NOTES
S) Pt is a 28 y.o.   at 34w4d  gestation. Routine prenatal care today. No complaints today. Discussed GBS at next visit. Labor precautions reviewed, all questions answered.    Fetal movement Normal  Cramping no  VB no  LOF no   Denies dysuria. Generally feels well today. Good self-care activities identified. Denies headaches, swelling, visual changes, or epigastric pain .     O) Blood pressure 122/70, weight 93.9 kg (207 lb).        Labs:       PNL: WNL       GCT: 165, but 3 hour WNL        AFP: Not Examined       GBS: Next visit       Pertinent ultrasound -        18- Survey WNL, FUNMILAYO 18.0cm, c/w prev dating    A) IUP at 34w4d       S>D. US ordered for growth and FUNMILAYO. Discussed POC with patient and possible reasons for uterus to be measuring larger than expected. Exam to be done at next available         Patient Active Problem List    Diagnosis Date Noted   • Supervision of normal pregnancy 2018          SVE: deferred       Chaperone offered: n/a         TDAP: yes       FLU: yes        BTL: no       : n/a       C/S Consent: n/a       IOL or C/S scheduled: no       LAST PAP: 18- negative         P) s/s ptl vs general discomforts. Fetal movements reviewed. General ed and anticipatory guidance. Nutrition/exercise/vitamin. Plans breast Plans pp contraception- unsure, but is thinking about Nexplanon.  Continue PNV.

## 2019-03-19 ENCOUNTER — HOSPITAL ENCOUNTER (OUTPATIENT)
Dept: RADIOLOGY | Facility: MEDICAL CENTER | Age: 29
End: 2019-03-19
Attending: NURSE PRACTITIONER
Payer: COMMERCIAL

## 2019-03-19 DIAGNOSIS — O26.843 UTERINE SIZE-DATE DISCREPANCY IN THIRD TRIMESTER: ICD-10-CM

## 2019-03-19 PROCEDURE — 76816 OB US FOLLOW-UP PER FETUS: CPT

## 2019-03-21 ENCOUNTER — HOSPITAL ENCOUNTER (OUTPATIENT)
Facility: MEDICAL CENTER | Age: 29
End: 2019-03-21
Attending: OBSTETRICS & GYNECOLOGY
Payer: COMMERCIAL

## 2019-03-21 ENCOUNTER — ROUTINE PRENATAL (OUTPATIENT)
Dept: OBGYN | Facility: MEDICAL CENTER | Age: 29
End: 2019-03-21
Payer: COMMERCIAL

## 2019-03-21 VITALS — WEIGHT: 210 LBS | DIASTOLIC BLOOD PRESSURE: 60 MMHG | SYSTOLIC BLOOD PRESSURE: 100 MMHG | BODY MASS INDEX: 37.2 KG/M2

## 2019-03-21 DIAGNOSIS — Z34.83 ENCOUNTER FOR SUPERVISION OF OTHER NORMAL PREGNANCY IN THIRD TRIMESTER: ICD-10-CM

## 2019-03-21 PROCEDURE — 87081 CULTURE SCREEN ONLY: CPT

## 2019-03-21 PROCEDURE — 87150 DNA/RNA AMPLIFIED PROBE: CPT

## 2019-03-21 PROCEDURE — 90040 PR PRENATAL FOLLOW UP: CPT | Performed by: OBSTETRICS & GYNECOLOGY

## 2019-03-21 NOTE — LETTER
March 21, 2019            Dee Frederick is currently being cared for at [facility].  This patient is pregnant and please excuse from work on 3/20/2019 due to medical necessity.  .        Thank you,          Radha Mcdaniel M.D.

## 2019-03-21 NOTE — PROGRESS NOTES
HUMBLE:  36w3d    Pt reports doing well.  Denies vaginal bleeding, contractions, LOF.  Reports +FM.    /60   Wt 95.3 kg (210 lb)   BMI 37.20 kg/m²   gen: AAO, NAD  FHTs: 150  FH: 38-39  SVE: /3  Pelvic exam was chaperoned by MA (AB).    A/P: 28 y.o.  @ 36w3d by 15wk US  - PNL up to date, Rh+/-, 3rd tri CBC/syphilis WNL, 1hr elevated - normal 3hr; anatomy US wnl  - GBS today  - growth US done 3/19: EFW 3017g (69%), AGA  - pt asked today for letter excusing her from work yesterday for back pain/discomfort.  Letter given, however pt informed that we typically cannot give letters for medically necessary days off when we have not seen or evaluated the pt. Recommend continue working through delivery (pt planning on taking off starting next week).    Reviewed labor precautions (to call or come to hospital for ctx q5min, VB, LOF, decreased FM)    RTC 1wks    Radha Mcdaniel MD  [facility], Women's Health

## 2019-03-23 LAB — GP B STREP DNA SPEC QL NAA+PROBE: NEGATIVE

## 2019-03-26 PROBLEM — O26.843 UTERINE SIZE-DATE DISCREPANCY IN THIRD TRIMESTER: Status: RESOLVED | Noted: 2019-03-08 | Resolved: 2019-03-26

## 2019-03-27 ENCOUNTER — ROUTINE PRENATAL (OUTPATIENT)
Dept: OBGYN | Facility: MEDICAL CENTER | Age: 29
End: 2019-03-27
Payer: COMMERCIAL

## 2019-03-27 VITALS — BODY MASS INDEX: 37.91 KG/M2 | DIASTOLIC BLOOD PRESSURE: 70 MMHG | WEIGHT: 214 LBS | SYSTOLIC BLOOD PRESSURE: 108 MMHG

## 2019-03-27 DIAGNOSIS — Z34.83 ENCOUNTER FOR SUPERVISION OF OTHER NORMAL PREGNANCY, THIRD TRIMESTER: ICD-10-CM

## 2019-03-27 PROCEDURE — 90040 PR PRENATAL FOLLOW UP: CPT | Performed by: NURSE PRACTITIONER

## 2019-03-27 NOTE — PROGRESS NOTES
Ob visit@ 37w2 Doing well. Active FM and denies pain,bleeding or leaking fluid.GBBS negative. Pt has a small amount of vaginal irritation externally.

## 2019-03-27 NOTE — PROGRESS NOTES
SUBJECTIVE:  Pt is a 28 y.o.   at 37w2d  gestation. Presents today for follow-up prenatal care. Reports no issues at this time except for labial itching.  Reports feeling good  fetal movement. Denies cramping/contractions, bleeding or leaking of fluid. Denies dysuria, headaches, N/V. Generally feels well today. Recent ER or L&D visits - none.     OBJECTIVE:  - See prenatal vitals flow  -   Vitals:    19 1024   BP: 108/70   Weight: 97.1 kg (214 lb)      Fundal Height - 40  FHT - 150  US normal recent growth scan  Prenatal labs normal pnp, too late afp, normal 3 hour glucose. GBS neg.               ASSESSMENT:   - IUP at 37w2d    - S=D   -   Patient Active Problem List    Diagnosis Date Noted   • Supervision of normal pregnancy 2018         PLAN:  - S/sx pregnancy and labor warning signs vs general discomforts discussed  - Fetal movements and kick counts reviewed   - Adequate hydration reinforced  - Nutrition/exercise/vitamin education; continued PNV   Desires nexplanon postpartum.

## 2019-04-04 ENCOUNTER — ROUTINE PRENATAL (OUTPATIENT)
Dept: OBGYN | Facility: MEDICAL CENTER | Age: 29
End: 2019-04-04
Payer: COMMERCIAL

## 2019-04-04 VITALS — BODY MASS INDEX: 37.91 KG/M2 | DIASTOLIC BLOOD PRESSURE: 60 MMHG | SYSTOLIC BLOOD PRESSURE: 112 MMHG | WEIGHT: 214 LBS

## 2019-04-04 DIAGNOSIS — Z34.83 ENCOUNTER FOR SUPERVISION OF OTHER NORMAL PREGNANCY IN THIRD TRIMESTER: ICD-10-CM

## 2019-04-04 PROCEDURE — 90040 PR PRENATAL FOLLOW UP: CPT | Performed by: OBSTETRICS & GYNECOLOGY

## 2019-04-04 NOTE — PROGRESS NOTES
Pt here today for OB follow up  Pt states denies VB and LOF  Reports +FM  Pharmacy Confirmed.  Chaperone offered and accepted.

## 2019-04-04 NOTE — PROGRESS NOTES
28 y.o.  38w3d The patient is here for routine obstetrical followup. She reports good fetal movement. She denies contractions, vaginal bleeding, or leaking of fluid.    The patient's pregnancy is complicated by   Patient Active Problem List    Diagnosis Date Noted   • Supervision of normal pregnancy 2018     GBS neg    Followup in 1 week  Labor precautions were discussed with patient  Fetal kick counts were discussed with patient

## 2019-04-11 ENCOUNTER — ROUTINE PRENATAL (OUTPATIENT)
Dept: OBGYN | Facility: MEDICAL CENTER | Age: 29
End: 2019-04-11
Payer: COMMERCIAL

## 2019-04-11 VITALS — BODY MASS INDEX: 38.09 KG/M2 | WEIGHT: 215 LBS | SYSTOLIC BLOOD PRESSURE: 120 MMHG | DIASTOLIC BLOOD PRESSURE: 68 MMHG

## 2019-04-11 DIAGNOSIS — Z3A.39 39 WEEKS GESTATION OF PREGNANCY: ICD-10-CM

## 2019-04-11 PROCEDURE — 90040 PR PRENATAL FOLLOW UP: CPT | Performed by: OBSTETRICS & GYNECOLOGY

## 2019-04-11 NOTE — PROGRESS NOTES
S: Pt presents for routine OB follow up. Good fetal movement.  No contractions, vaginal bleeding, or leakage of fluid.    Questions answered.    Doing well overall, is considering induction of labor.    O: /68   Wt 97.5 kg (215 lb)   BMI 38.09 kg/m²   Patients' weight gain, fluid intake and exercise level discussed.  Vitals, fundal height , fetal position, and FHR reviewed on flowsheet    Lab:No results found for this or any previous visit (from the past 336 hour(s)).    A/P:  28 y.o.  at 39w3d presents for routine obstetric follow-up.  Size equals dates and/or scan    1.  Continue prenatal vitamins.  2.  Fetal kick counts.  3.  Exercise at least 30 minutes daily.  4.  Drink at least 2L of water daily  5.  Labor precautions educated.  6.  Follow-up in 1 weeks.  7.  GBS negative    Patient requests induction of labor.  She is over 39 weeks I will send a request to labor delivery.

## 2019-04-15 ENCOUNTER — HOSPITAL ENCOUNTER (INPATIENT)
Facility: MEDICAL CENTER | Age: 29
LOS: 2 days | End: 2019-04-17
Attending: OBSTETRICS & GYNECOLOGY | Admitting: OBSTETRICS & GYNECOLOGY
Payer: COMMERCIAL

## 2019-04-15 ENCOUNTER — ANESTHESIA (OUTPATIENT)
Dept: OBGYN | Facility: MEDICAL CENTER | Age: 29
End: 2019-04-15
Payer: COMMERCIAL

## 2019-04-15 ENCOUNTER — APPOINTMENT (OUTPATIENT)
Dept: OBGYN | Facility: MEDICAL CENTER | Age: 29
End: 2019-04-15
Attending: OBSTETRICS & GYNECOLOGY
Payer: COMMERCIAL

## 2019-04-15 ENCOUNTER — ANESTHESIA EVENT (OUTPATIENT)
Dept: OBGYN | Facility: MEDICAL CENTER | Age: 29
End: 2019-04-15
Payer: COMMERCIAL

## 2019-04-15 LAB
BASOPHILS # BLD AUTO: 0.2 % (ref 0–1.8)
BASOPHILS # BLD: 0.02 K/UL (ref 0–0.12)
EOSINOPHIL # BLD AUTO: 0.02 K/UL (ref 0–0.51)
EOSINOPHIL NFR BLD: 0.2 % (ref 0–6.9)
ERYTHROCYTE [DISTWIDTH] IN BLOOD BY AUTOMATED COUNT: 50 FL (ref 35.9–50)
HCT VFR BLD AUTO: 36.2 % (ref 37–47)
HGB BLD-MCNC: 12.4 G/DL (ref 12–16)
HOLDING TUBE BB 8507: NORMAL
IMM GRANULOCYTES # BLD AUTO: 0.05 K/UL (ref 0–0.11)
IMM GRANULOCYTES NFR BLD AUTO: 0.4 % (ref 0–0.9)
LYMPHOCYTES # BLD AUTO: 1.92 K/UL (ref 1–4.8)
LYMPHOCYTES NFR BLD: 15.2 % (ref 22–41)
MCH RBC QN AUTO: 32.2 PG (ref 27–33)
MCHC RBC AUTO-ENTMCNC: 34.3 G/DL (ref 33.6–35)
MCV RBC AUTO: 94 FL (ref 81.4–97.8)
MONOCYTES # BLD AUTO: 0.71 K/UL (ref 0–0.85)
MONOCYTES NFR BLD AUTO: 5.6 % (ref 0–13.4)
NEUTROPHILS # BLD AUTO: 9.93 K/UL (ref 2–7.15)
NEUTROPHILS NFR BLD: 78.4 % (ref 44–72)
NRBC # BLD AUTO: 0 K/UL
NRBC BLD-RTO: 0 /100 WBC
PLATELET # BLD AUTO: 278 K/UL (ref 164–446)
PMV BLD AUTO: 11 FL (ref 9–12.9)
RBC # BLD AUTO: 3.85 M/UL (ref 4.2–5.4)
WBC # BLD AUTO: 12.7 K/UL (ref 4.8–10.8)

## 2019-04-15 PROCEDURE — 10907ZC DRAINAGE OF AMNIOTIC FLUID, THERAPEUTIC FROM PRODUCTS OF CONCEPTION, VIA NATURAL OR ARTIFICIAL OPENING: ICD-10-PCS | Performed by: OBSTETRICS & GYNECOLOGY

## 2019-04-15 PROCEDURE — 700105 HCHG RX REV CODE 258

## 2019-04-15 PROCEDURE — 85025 COMPLETE CBC W/AUTO DIFF WBC: CPT

## 2019-04-15 PROCEDURE — 700101 HCHG RX REV CODE 250: Performed by: ANESTHESIOLOGY

## 2019-04-15 PROCEDURE — 770002 HCHG ROOM/CARE - OB PRIVATE (112)

## 2019-04-15 PROCEDURE — 59025 FETAL NON-STRESS TEST: CPT

## 2019-04-15 PROCEDURE — 700111 HCHG RX REV CODE 636 W/ 250 OVERRIDE (IP): Performed by: ANESTHESIOLOGY

## 2019-04-15 PROCEDURE — 303615 HCHG EPIDURAL/SPINAL ANESTHESIA FOR LABOR

## 2019-04-15 PROCEDURE — 700111 HCHG RX REV CODE 636 W/ 250 OVERRIDE (IP): Performed by: OBSTETRICS & GYNECOLOGY

## 2019-04-15 PROCEDURE — 0UQMXZZ REPAIR VULVA, EXTERNAL APPROACH: ICD-10-PCS | Performed by: OBSTETRICS & GYNECOLOGY

## 2019-04-15 PROCEDURE — 304965 HCHG RECOVERY SERVICES

## 2019-04-15 PROCEDURE — 700111 HCHG RX REV CODE 636 W/ 250 OVERRIDE (IP)

## 2019-04-15 PROCEDURE — 10S0XZZ REPOSITION PRODUCTS OF CONCEPTION, EXTERNAL APPROACH: ICD-10-PCS | Performed by: OBSTETRICS & GYNECOLOGY

## 2019-04-15 PROCEDURE — 59409 OBSTETRICAL CARE: CPT

## 2019-04-15 PROCEDURE — 36415 COLL VENOUS BLD VENIPUNCTURE: CPT

## 2019-04-15 PROCEDURE — 59400 OBSTETRICAL CARE: CPT | Performed by: OBSTETRICS & GYNECOLOGY

## 2019-04-15 RX ORDER — DOCUSATE SODIUM 100 MG/1
100 CAPSULE, LIQUID FILLED ORAL 2 TIMES DAILY PRN
Status: DISCONTINUED | OUTPATIENT
Start: 2019-04-15 | End: 2019-04-17 | Stop reason: HOSPADM

## 2019-04-15 RX ORDER — SODIUM CHLORIDE, SODIUM LACTATE, POTASSIUM CHLORIDE, CALCIUM CHLORIDE 600; 310; 30; 20 MG/100ML; MG/100ML; MG/100ML; MG/100ML
INJECTION, SOLUTION INTRAVENOUS
Status: COMPLETED
Start: 2019-04-15 | End: 2019-04-15

## 2019-04-15 RX ORDER — ROPIVACAINE HYDROCHLORIDE 2 MG/ML
INJECTION, SOLUTION EPIDURAL; INFILTRATION; PERINEURAL CONTINUOUS
Status: DISCONTINUED | OUTPATIENT
Start: 2019-04-15 | End: 2019-04-17 | Stop reason: HOSPADM

## 2019-04-15 RX ORDER — SODIUM CHLORIDE, SODIUM LACTATE, POTASSIUM CHLORIDE, AND CALCIUM CHLORIDE .6; .31; .03; .02 G/100ML; G/100ML; G/100ML; G/100ML
250 INJECTION, SOLUTION INTRAVENOUS PRN
Status: DISCONTINUED | OUTPATIENT
Start: 2019-04-15 | End: 2019-04-15 | Stop reason: HOSPADM

## 2019-04-15 RX ORDER — ROPIVACAINE HYDROCHLORIDE 2 MG/ML
INJECTION, SOLUTION EPIDURAL; INFILTRATION; PERINEURAL
Status: COMPLETED
Start: 2019-04-15 | End: 2019-04-15

## 2019-04-15 RX ORDER — MISOPROSTOL 200 UG/1
600 TABLET ORAL
Status: DISCONTINUED | OUTPATIENT
Start: 2019-04-15 | End: 2019-04-17 | Stop reason: HOSPADM

## 2019-04-15 RX ORDER — SODIUM CHLORIDE, SODIUM LACTATE, POTASSIUM CHLORIDE, AND CALCIUM CHLORIDE .6; .31; .03; .02 G/100ML; G/100ML; G/100ML; G/100ML
1000 INJECTION, SOLUTION INTRAVENOUS
Status: DISCONTINUED | OUTPATIENT
Start: 2019-04-15 | End: 2019-04-15 | Stop reason: HOSPADM

## 2019-04-15 RX ORDER — LIDOCAINE HCL/EPINEPHRINE/PF 2%-1:200K
VIAL (ML) INJECTION PRN
Status: DISCONTINUED | OUTPATIENT
Start: 2019-04-15 | End: 2019-04-15 | Stop reason: SURG

## 2019-04-15 RX ORDER — VITAMIN A ACETATE, BETA CAROTENE, ASCORBIC ACID, CHOLECALCIFEROL, .ALPHA.-TOCOPHEROL ACETATE, DL-, THIAMINE MONONITRATE, RIBOFLAVIN, NIACINAMIDE, PYRIDOXINE HYDROCHLORIDE, FOLIC ACID, CYANOCOBALAMIN, CALCIUM CARBONATE, FERROUS FUMARATE, ZINC OXIDE, CUPRIC OXIDE 3080; 12; 120; 400; 1; 1.84; 3; 20; 22; 920; 25; 200; 27; 10; 2 [IU]/1; UG/1; MG/1; [IU]/1; MG/1; MG/1; MG/1; MG/1; MG/1; [IU]/1; MG/1; MG/1; MG/1; MG/1; MG/1
1 TABLET, FILM COATED ORAL EVERY MORNING
Status: DISCONTINUED | OUTPATIENT
Start: 2019-04-16 | End: 2019-04-17 | Stop reason: HOSPADM

## 2019-04-15 RX ORDER — SODIUM CHLORIDE, SODIUM LACTATE, POTASSIUM CHLORIDE, CALCIUM CHLORIDE 600; 310; 30; 20 MG/100ML; MG/100ML; MG/100ML; MG/100ML
INJECTION, SOLUTION INTRAVENOUS PRN
Status: DISCONTINUED | OUTPATIENT
Start: 2019-04-15 | End: 2019-04-17 | Stop reason: HOSPADM

## 2019-04-15 RX ORDER — ROPIVACAINE HYDROCHLORIDE 2 MG/ML
INJECTION, SOLUTION EPIDURAL; INFILTRATION PRN
Status: DISCONTINUED | OUTPATIENT
Start: 2019-04-15 | End: 2019-04-15 | Stop reason: SURG

## 2019-04-15 RX ADMIN — Medication 20 UNITS: at 20:25

## 2019-04-15 RX ADMIN — ROPIVACAINE HYDROCHLORIDE 100 ML: 2 INJECTION, SOLUTION EPIDURAL; INFILTRATION; PERINEURAL at 12:13

## 2019-04-15 RX ADMIN — Medication 125 ML/HR: at 20:06

## 2019-04-15 RX ADMIN — LIDOCAINE HYDROCHLORIDE,EPINEPHRINE BITARTRATE 4 ML: 20; .005 INJECTION, SOLUTION EPIDURAL; INFILTRATION; INTRACAUDAL; PERINEURAL at 12:05

## 2019-04-15 RX ADMIN — ROPIVACAINE HYDROCHLORIDE 100 ML: 2 INJECTION, SOLUTION EPIDURAL; INFILTRATION at 12:13

## 2019-04-15 RX ADMIN — SODIUM CHLORIDE, POTASSIUM CHLORIDE, SODIUM LACTATE AND CALCIUM CHLORIDE 1000 ML: 600; 310; 30; 20 INJECTION, SOLUTION INTRAVENOUS at 11:45

## 2019-04-15 RX ADMIN — ROPIVACAINE HYDROCHLORIDE 8 ML: 2 INJECTION, SOLUTION EPIDURAL; INFILTRATION at 12:05

## 2019-04-15 ASSESSMENT — LIFESTYLE VARIABLES
ALCOHOL_USE: NO
EVER_SMOKED: NEVER

## 2019-04-15 ASSESSMENT — COPD QUESTIONNAIRES
COPD SCREENING SCORE: 0
HAVE YOU SMOKED AT LEAST 100 CIGARETTES IN YOUR ENTIRE LIFE: NO/DON'T KNOW
DO YOU EVER COUGH UP ANY MUCUS OR PHLEGM?: NO/ONLY WITH OCCASIONAL COLDS OR INFECTIONS
IN THE PAST 12 MONTHS DO YOU DO LESS THAN YOU USED TO BECAUSE OF YOUR BREATHING PROBLEMS: DISAGREE/UNSURE
DURING THE PAST 4 WEEKS HOW MUCH DID YOU FEEL SHORT OF BREATH: NONE/LITTLE OF THE TIME

## 2019-04-15 ASSESSMENT — PATIENT HEALTH QUESTIONNAIRE - PHQ9
2. FEELING DOWN, DEPRESSED, IRRITABLE, OR HOPELESS: NOT AT ALL
1. LITTLE INTEREST OR PLEASURE IN DOING THINGS: NOT AT ALL
SUM OF ALL RESPONSES TO PHQ9 QUESTIONS 1 AND 2: 0

## 2019-04-15 NOTE — PROGRESS NOTES
S: Pt is comfortable with CTX.      O:  There were no vitals filed for this visit.        FHTs:  Baseline 140, + accels, - decels, moderate variability        [place]: Contractions q 2-3 minutes, firm to palpation        SVE: 7/100/0     A/P:  1.  IUP @ 40w0d            2.  Cat 1 FHTs             3.  AROM clear             4.  Anticipate vaginal delivery           5.  Will reassess as indicated    Chelita Roper CNM, APRN

## 2019-04-15 NOTE — ANESTHESIA PREPROCEDURE EVALUATION
Relevant Problems   No relevant active problems       Physical Exam    Airway   Mallampati: II  TM distance: >3 FB  Neck ROM: full       Cardiovascular - normal exam  Rhythm: regular  Rate: normal  (-) murmur     Dental - normal exam         Pulmonary - normal exam     Abdominal    Neurological - normal exam                 Anesthesia Plan    ASA 3   ASA physical status 3 criteria: other (comment)    Plan - epidural   Neuraxial block will be primary anesthetic                  Informed Consent:

## 2019-04-15 NOTE — ANESTHESIA PROCEDURE NOTES
Epidural Block  Performed by: MARSHALL HODGSON  Authorized by: MARSHALL HODGSON     Patient Location:  OB  Start Time:  4/15/2019 12:00 PM  End Time:  4/15/2019 12:10 PM  Reason for Block: labor analgesia    patient identified, IV checked, site marked, risks and benefits discussed, surgical consent, monitors and equipment checked, pre-op evaluation and timeout performed    Patient Position:  Sitting  Prep: ChloraPrep, patient draped and sterile technique    Monitoring:  Blood pressure, continuous pulse oximetry and heart rate  Approach:  Midline  Location:  L2-L3  Injection Technique:  ANDREA saline  Skin infiltration:  Lidocaine  Strength:  1%  Dose:  3ml  Needle Type:  Tuohy  Needle Gauge:  17 G  Needle Length:  3.5 in  Loss of resistance::  7  Catheter Size:  19 G  Catheter at Skin Depth:  5  Test Dose:  Lidocaine 1.5% with epinephrine 1-to-200,000

## 2019-04-15 NOTE — H&P
History and Physical      Dee Frederick is a 28 y.o. year old female  at 40w0d who presents for CTX. Patient is scheduled for elective IOL tomorrow.     Subjective:   positive  For CTXS.   positive Feels pain   negative for LOF  negative for vaginal bleeding.   positive for fetal movement    ROS: Pertinent items are noted in HPI.    No past medical history on file.  No past surgical history on file.  OB History    Para Term  AB Living   2 1 1 0 0 1   SAB TAB Ectopic Molar Multiple Live Births   0 0 0 0 0 1      # Outcome Date GA Lbr Basil/2nd Weight Sex Delivery Anes PTL Lv   2 Current            1 Term 03/19/10 41w0d  3.884 kg (8 lb 9 oz) M Induction   ANTHONY        Social History     Social History   • Marital status: Single     Spouse name: N/A   • Number of children: N/A   • Years of education: N/A     Occupational History   • Not on file.     Social History Main Topics   • Smoking status: Never Smoker   • Smokeless tobacco: Never Used   • Alcohol use No   • Drug use: No   • Sexual activity: Yes     Other Topics Concern   • Not on file     Social History Narrative   • No narrative on file     Allergies: Patient has no known allergies.  No current facility-administered medications for this encounter.     Prenatal care with [facility] starting at 20w3d-total 10 visits with following problems:  Patient Active Problem List    Diagnosis Date Noted   • Supervision of normal pregnancy 2018         Objective:      There were no vitals taken for this visit.    General:   no acute distress   Skin:   normal   HEENT:  PERRLA   Lungs:   CTA bilateral   Heart:   S1, S2 normal, no murmur, click, rub or gallop, regular rate and rhythm, brisk carotid upstroke without bruits, peripheral pulses very brisk, chest is clear without rales or wheezing, no pedal edema, no JVD, no hepatosplenomegaly   Abdomen:   gravid, NT   EFW:  9381-8428 g   Pelvis:  Exam deferred., proven to 3884 g   FHTs: 140 BPM + accels - decels  moderate variability   Contractions: 3 contractions in 10 min mod/firm to palpation   Uterine Size: S=D   Presentations: Cephalic per RN   Cervix: Per RN    Dilation: 5cm    Effacement: 100%    Station:  -2    Consistency: Soft    Position: Posterior     Complete OB US  Fetal survey at 24 wks: normal  Limited US at 36w3d: size appropriate for age, FUNMILAYO: 18.2      Lab Review  Lab:   Blood type: O     Recent Results (from the past 5880 hour(s))   THINPREP RFLX HPV ASCUS W/CTNG    Collection Time: 11/29/18  1:23 PM   Result Value Ref Range    Cytology Reg See Path Report     Source Cervical     C. trachomatis by PCR Negative Negative    N. gonorrhoeae by PCR Negative Negative   AFP TETRA    Collection Time: 01/16/19  1:04 PM   Result Value Ref Range    AFP Value -Eia 169 ng/mL    AFP MOM Value Not Done     Ue3 Value 3.12 ng/mL    Ue3 Mom Not Done     Patient's hCG, 2nd Trimester 24773 IU/L    hCG MoM, 2nd Trimester Not Done     Lynn Value -Eia 306 pg/mL    Lynn Mom Value Not Done     Interpretation See Note     [age] URIAH [age]    Maternal Weight 173.0 lbs.     Gest. Age on Collection Date 27 wks, 2 days     Gestational Age Based On Ultrasound     Multiple Pregnancy Ivan     Race Nonblack     Insulin Dependent Diabetes No     Smoking No     Family Hx NTD No     Family Hx of Aneuploidy No     Specimen See Note     EER Quad, Maternal Serum See Note    GLUCOSE 1HR GESTATIONAL    Collection Time: 01/16/19  1:04 PM   Result Value Ref Range    Glucose, Post Dose 165 (H) 70 - 139 mg/dL   T.PALLIDUM AB EIA    Collection Time: 01/16/19  1:04 PM   Result Value Ref Range    Syphilis, Treponemal Qual Non Reactive Non Reactive   PRENATAL PANEL 3+HIV+UACXI    Collection Time: 01/16/19  1:04 PM   Result Value Ref Range    Color Yellow     Character Cloudy (A)     Specific Gravity 1.017 <1.035    Ph 6.0 5.0 - 8.0    Glucose Negative Negative mg/dL    Ketones Negative Negative mg/dL    Protein Negative Negative mg/dL     Bilirubin Negative Negative    Urobilinogen, Urine 0.2 Negative    Nitrite Negative Negative    Leukocyte Esterase Trace (A) Negative    Occult Blood Negative Negative    Micro Urine Req Microscopic     WBC 11.8 (H) 4.8 - 10.8 K/uL    RBC 3.49 (L) 4.20 - 5.40 M/uL    Hemoglobin 11.5 (L) 12.0 - 16.0 g/dL    Hematocrit 33.6 (L) 37.0 - 47.0 %    MCV 96.3 81.4 - 97.8 fL    MCH 33.0 27.0 - 33.0 pg    MCHC 34.2 33.6 - 35.0 g/dL    RDW 43.3 35.9 - 50.0 fL    Platelet Count 311 164 - 446 K/uL    MPV 10.3 9.0 - 12.9 fL    Neutrophils-Polys 78.50 (H) 44.00 - 72.00 %    Lymphocytes 13.30 (L) 22.00 - 41.00 %    Monocytes 5.80 0.00 - 13.40 %    Eosinophils 1.70 0.00 - 6.90 %    Basophils 0.20 0.00 - 1.80 %    Immature Granulocytes 0.50 0.00 - 0.90 %    Nucleated RBC 0.00 /100 WBC    Neutrophils (Absolute) 9.30 (H) 2.00 - 7.15 K/uL    Lymphs (Absolute) 1.57 1.00 - 4.80 K/uL    Monos (Absolute) 0.69 0.00 - 0.85 K/uL    Eos (Absolute) 0.20 0.00 - 0.51 K/uL    Baso (Absolute) 0.02 0.00 - 0.12 K/uL    Immature Granulocytes (abs) 0.06 0.00 - 0.11 K/uL    NRBC (Absolute) 0.00 K/uL    Rubella IgG Antibody 266.00 IU/mL    Syphilis, Treponemal Qual Non Reactive Non Reactive    Hepatitis B Surface Antigen Negative Negative   HIV AG/AB COMBO ASSAY SCREENING    Collection Time: 01/16/19  1:04 PM   Result Value Ref Range    HIV Ag/Ab Combo Assay Non Reactive Non Reactive   OP PRENATAL PANEL-BLOOD BANK    Collection Time: 01/16/19  1:04 PM   Result Value Ref Range    ABO Grouping Only O     Rh Grouping Only POS     Antibody Screen Scrn NEG    URINE MICROSCOPIC (W/UA)    Collection Time: 01/16/19  1:04 PM   Result Value Ref Range    WBC 0-2 /hpf    RBC 0-2 /hpf    Bacteria Moderate (A) None /hpf    Epithelial Cells Moderate (A) /hpf    Hyaline Cast 0-2 /lpf   GLUCOSE 3 HR GESTATIONAL    Collection Time: 01/31/19  9:44 AM   Result Value Ref Range    Baseline Glucose 99 (H) 65 - 95 mg/dL    Glucose 1 Hour 133 65 - 180 mg/dL    Glucose 2 Hour 135  65 - 155 mg/dL    Glucose 3 Hour 115 65 - 140 mg/dL   GRP B STREP, BY PCR (DHALIWAL BROTH)    Collection Time: 03/21/19 11:15 AM   Result Value Ref Range    Strep Gp B DNA PCR Negative Negative        Assessment:   1.  IUP at 40w0d  2.  Labor status: Active phase labor.  3.  Cat 1 FHTs  4.  Obstetrical history significant for   Patient Active Problem List    Diagnosis Date Noted   • Supervision of normal pregnancy 12/27/2018   .      Plan:     Admit to L&D  GBS negative  Routine labs  Pain management prn    Chelita Roper, NELLI, APRN

## 2019-04-15 NOTE — CARE PLAN
Problem: Pain  Goal: Alleviation of Pain or a reduction in pain to the patient's comfort goal  Outcome: PROGRESSING AS EXPECTED  Pt resting comfortably with an epidural at this time.    Problem: Risk for Infection, Impaired Wound Healing  Goal: Remain free from signs and symptoms of infection  Outcome: PROGRESSING AS EXPECTED  No s/s of infection noted, pt remains afebrile

## 2019-04-15 NOTE — PROGRESS NOTES
EDC - 4/15/19 EGA - 40.0    1125 -  Report received from ARUNA Bronson RN. Pt ambulated to 223 at this time. FOB at bedside. POC discussed with pt and family members, all questions answered.   1145 IV started labs drawn.   1154 Admission procedures completed at this time.   1210 - Dr. Kaur at bedside. Time out called at 1210. Epidural placed at this time by Dr Kaur. Test dose at 1218 - No reaction detected - VSS. Dermatome level of T8. Epidural infusion settings are : 10mL/hr continuous infusion, 5mL bolus every 15 minutes with a 25mL/hr dose limit.   1248 - Taylor placed and draining to gravity. SVE 5-6/100/-1. Turned to right side. Category 1 FHT tracing at this time. No complaints at this time.    ESTELA Roper CNAPRYL at bedside, SVE Lip/0  1722 SVE 8/90/-1 Dr. Khalil notified.   Pt states she is feeling pressure SVE Complete/+1 Dr. Portillo notified of pt status and FHTs as well as pts temp. States he will come to the bedside.   Dr. Portillo at bedside to push with pt. Pt repositioned into stirrups, pt educated on proper pushing technique. RN continuously at bedside evaluating FHT and pushing progress.   Decision made to do a vacuum assisted delivery at this time, NICU RT Pickup and Tech called to room at this time.   Kiwi vacuum applied 3 pulls 0 popoffs for 50 seconds. Right shoulder dystocia for 55 seconds, corkscrew and naomie manuvers preformed    of viable male infant by Dr. Portillo to radiant warmer. Apgars 7/8 infant transferred to NBN.     Placenta delivered intact.     Fundus firm, lochia Light  2150 Report given to SADA Casillas RN

## 2019-04-15 NOTE — PROGRESS NOTES
28 y.o.  EDC 4/15/19 EGA 40.0 here to LDA2 with FOB [place] c/o UC's q 5 minutes since about 0700. Pt reports positive fetal movement, denies vaginal bleeding or LOF. Scheduled for outpt gel tonight at 2200 and IOL tomorrow.     NKDA, O+, RI, HBsAG negative, GBS negative    0930 SVE 4/100/-2. Report to Chelita Mesa CNM. Order to get reactive tracing then have pt walk and recheck in 1 hour.   1000 Reactive NST obtained. , moderate variability, accels present, no decels. [place] shows UC's q 3-4 minutes, palpating moderate. Pt up to walk.   1045 Repeat SVE 5/100/-2  1130 Report to Evelin Reynolds, transferred to room 223 for L&D.

## 2019-04-16 LAB
ERYTHROCYTE [DISTWIDTH] IN BLOOD BY AUTOMATED COUNT: 51 FL (ref 35.9–50)
HCT VFR BLD AUTO: 31.3 % (ref 37–47)
HGB BLD-MCNC: 10.6 G/DL (ref 12–16)
MCH RBC QN AUTO: 32 PG (ref 27–33)
MCHC RBC AUTO-ENTMCNC: 33.9 G/DL (ref 33.6–35)
MCV RBC AUTO: 94.6 FL (ref 81.4–97.8)
PLATELET # BLD AUTO: 201 K/UL (ref 164–446)
PMV BLD AUTO: 10.4 FL (ref 9–12.9)
RBC # BLD AUTO: 3.31 M/UL (ref 4.2–5.4)
WBC # BLD AUTO: 19.2 K/UL (ref 4.8–10.8)

## 2019-04-16 PROCEDURE — 36415 COLL VENOUS BLD VENIPUNCTURE: CPT

## 2019-04-16 PROCEDURE — A9270 NON-COVERED ITEM OR SERVICE: HCPCS | Performed by: OBSTETRICS & GYNECOLOGY

## 2019-04-16 PROCEDURE — 85027 COMPLETE CBC AUTOMATED: CPT

## 2019-04-16 PROCEDURE — 700102 HCHG RX REV CODE 250 W/ 637 OVERRIDE(OP): Performed by: OBSTETRICS & GYNECOLOGY

## 2019-04-16 PROCEDURE — 770002 HCHG ROOM/CARE - OB PRIVATE (112)

## 2019-04-16 RX ORDER — HYDROCODONE BITARTRATE AND ACETAMINOPHEN 5; 325 MG/1; MG/1
1-2 TABLET ORAL EVERY 6 HOURS PRN
Status: DISCONTINUED | OUTPATIENT
Start: 2019-04-16 | End: 2019-04-17 | Stop reason: HOSPADM

## 2019-04-16 RX ORDER — IBUPROFEN 800 MG/1
800 TABLET ORAL EVERY 6 HOURS PRN
Status: DISCONTINUED | OUTPATIENT
Start: 2019-04-16 | End: 2019-04-17 | Stop reason: HOSPADM

## 2019-04-16 RX ADMIN — IBUPROFEN 800 MG: 800 TABLET, FILM COATED ORAL at 22:23

## 2019-04-16 ASSESSMENT — EDINBURGH POSTNATAL DEPRESSION SCALE (EPDS)
I HAVE BLAMED MYSELF UNNECESSARILY WHEN THINGS WENT WRONG: NOT VERY OFTEN
THINGS HAVE BEEN GETTING ON TOP OF ME: NO, MOST OF THE TIME I HAVE COPED QUITE WELL
I HAVE LOOKED FORWARD WITH ENJOYMENT TO THINGS: AS MUCH AS I EVER DID
I HAVE FELT SAD OR MISERABLE: NOT VERY OFTEN
I HAVE BEEN ANXIOUS OR WORRIED FOR NO GOOD REASON: YES, SOMETIMES
I HAVE BEEN SO UNHAPPY THAT I HAVE BEEN CRYING: NO, NEVER
I HAVE BEEN ABLE TO LAUGH AND SEE THE FUNNY SIDE OF THINGS: AS MUCH AS I ALWAYS COULD
I HAVE FELT SCARED OR PANICKY FOR NO GOOD REASON: NO, NOT MUCH
I HAVE BEEN SO UNHAPPY THAT I HAVE HAD DIFFICULTY SLEEPING: NOT VERY OFTEN
THE THOUGHT OF HARMING MYSELF HAS OCCURRED TO ME: NEVER

## 2019-04-16 NOTE — ANESTHESIA POSTPROCEDURE EVALUATION
Patient: Dee Frederick    Procedure Summary     Date:  04/15/19 Room / Location:      Anesthesia Start:  1200 Anesthesia Stop:  2020    Procedure:  Labor Epidural Diagnosis:      Scheduled Providers:   Responsible Provider:  Mark Kaur M.D.    Anesthesia Type:  epidural ASA Status:  3          Final Anesthesia Type: epidural  Last vitals  BP   Blood Pressure: 135/81    Temp   37 °C (98.6 °F)    Pulse   Pulse: (!) 105   Resp        SpO2          Anesthesia Post Evaluation    Patient location during evaluation: PACU  Patient participation: complete - patient participated  Level of consciousness: awake and alert    Airway patency: patent  Anesthetic complications: no  Cardiovascular status: hemodynamically stable  Respiratory status: acceptable  Hydration status: euvolemic    PONV: none

## 2019-04-16 NOTE — LACTATION NOTE
Mom states no questions, concerns, or pain related to breastfeeding. Baby nursing every 1-3 hours. Mom encouraged to call for assistance as needed.

## 2019-04-16 NOTE — PROGRESS NOTES
Received patient from labor and delivery via wheelchair with Bj BERMAN. Per report, Pt had a temp of 100.6 which the went down 1 hour later to 100. Pt to have temp taken every hour for the first four hours on postpartum. Assessment done. Fundus firm. Lochia light. Instructed patient to increase fluid intake and to void as needed and to watch for increased bleeding.  Patient denies pain at this time. Baby is in the NBN for desaturations. Pt has been to the NBN to see baby. Pt wants to breastfeed but since baby is in the NBN will get pt started on pumping. Will inform lactation specialist. Pt would like to pump. Call light within reach. Will continue to monitor VS.    0000: Informed lactation specialist about starting pt on a breast pump. Lactation in to see pt and set up pump equipment.

## 2019-04-16 NOTE — CARE PLAN
Problem: Alteration in comfort related to episiotomy, vaginal repair and/or after birth pains  Goal: Patient verbalizes acceptable pain level  Outcome: PROGRESSING AS EXPECTED  Pt denies need for pain medication.    Problem: Potential knowledge deficit related to lack of understanding of self and  care  Goal: Patient will demonstrate ability to care for self and infant  Outcome: PROGRESSING AS EXPECTED  Pt up in room caring for infant and self well.

## 2019-04-16 NOTE — CONSULTS
Initial visit.. Infant is in NBN for observation. HG pump is in room, and MOB started pumping, settings are 80 CPMS x 2 minutes, decrease to 60 CPMS after 2 minutes. Suction at 25% or higher for comfort, to pump for 15 minutes, every 3 hours, with one 5 hour stretch to allow for rest, or until when infant is able to start latching on. Flange size appear appropriate, and no rubbing noted.  MOB has [facility],  She is aware of outpatient consultations at the [facility], number provided, and invited to breastfeeding circles.  MOB has no other questions or concerns regarding breastfeeding. Encouraged to call for assistance when she's able to start latching infant

## 2019-04-16 NOTE — L&D DELIVERY NOTE
Vaginal Delivery Procedure Note:    Dee Frederick is a 28 y.o. , now Para      Weeks of gestation: 40 weeks  Diagnosis: Labor     of viable male infant over intact perineum.  APGARs 7 and 8  Birth weight 4802 g  Nuchal cord none  Placenta delivered spontaneously. 3 Vessel cord.  Laceration: Bilateral labial  Repaired laceration with 2-0 vicryl suture in usual running fashion.  Excellent hemostasis.    Anesthesia - epidural  Sponge and needle counts correct.  Patient tolerated procedure well.    Patient admitted earlier in the day in spontaneous labor.  Progressed through of labor and was found to be completely dilated.  The same time she was found to have a temperature of 100.6 degrees.  Fetus tachycardic in the 180-200 bpm range.    Discussion was then held with the patient regarding need for immediate delivery.  Infant in OA position.  No caput Noted.    Patient began expulsive efforts.  Adequate descent was noted.  Fetal heart rate remained 180-190 bpm during the first episodes of expulsion.  Fetal heart rate then dropped to 130 bpm.  Fetal vertex at +4 station at this time.  Discussion held the patient need to expedite delivery due to a drop in the baseline from 180 to 130 bpm, suggestive of a deceleration.    Risks of vacuum delivery were discussed the patient.  Mainly risk of bruising, lacerations, intra-cranial hemorrhage, fractures.  All questions were answered and verbal consent was obtained.    A Kiwi vacuum device was then placed at the fetal vertex during the next contraction.  Examination showed no entrapment of vaginal mucosa.  Total of 3 pulls were performed.  No pop off noted.  Head the head was delivered the vacuum device was disconnected.    There was noted to be a shoulder dystocia at this time.  The hips were hyperflexed and the anterior (right) shoulder was then rotated counterclockwise to allow for delivery of the infant.    The shoulder was then delivered.  Patient  continue with expulsive efforts.  Rest of the infant was delivered to a sterile field.  Total time the perineum approximately 50-55 seconds    Cord was then clamped and cut and the infant was handed to waiting NICU personnel.      Cord gases obtained.    Placenta was then spontaneously delivered.  Examination of the cervix vagina perineum showed bilateral labial lacerations.  These were repaired with 2-0 Vicryl suture in the usual manner.    Sponge, instrument and needle count was correct x2.    We will monitor for signs of fever in the postpartum period.  May need IV antibiotics if patient becomes febrile

## 2019-04-16 NOTE — ANESTHESIA QCDR
2019 Qualified Clinical Data Registry (for Quality Improvement)     Postoperative nausea/vomiting risk protocol (Adult = 18 yrs and Pediatric 3-17 yrs)- (430 and 463)  General inhalation anesthetic (NOT TIVA) with PONV risk factors: No  Provision of anti-emetic therapy with at least 2 different classes of agents: N/A  Patient DID NOT receive anti-emetic therapy and reason is documented in Medical Record: N/A    Multimodal Pain Management- (AQI59)  Patient undergoing Elective Surgery (i.e. Outpatient, or ASC, or Prescheduled Surgery prior to Hospital Admission): No  Use of Multimodal Pain Management, two or more drugs and/or interventions, NOT including systemic opioids: N/A  Exception: Documented allergy to multiple classes of analgesics: N/A    PACU assessment of acute postoperative pain prior to Anesthesia Care End- Applies to Patients Age = 18- (ABG7)  Initial PACU pain score is which of the following: < 7/10  Patient unable to report pain score: N/A    Post-anesthetic transfer of care checklist/protocol to PACU/ICU- (426 and 427)  Upon conclusion of case, patient transferred to which of the following locations:   Use of transfer checklist/protocol:   Exclusion: Service Performed in Patient Hospital Room (and thus did not require transfer):     PACU Reintubation- (AQI31)  General anesthesia requiring endotracheal intubation (ETT) along with subsequent extubation in OR or PACU: No  Required reintubation in the PACU: N/A  Extubation was a planned trial documented in the medical record prior to removal of the original airway device: N/A    Unplanned admission to ICU related to anesthesia service up through end of PACU care- (MD51)  Unplanned admission to ICU (not initially anticipated at anesthesia start time): No

## 2019-04-16 NOTE — PROGRESS NOTES
S: Pt is comfortable with epidural.      O:  There were no vitals filed for this visit.        FHTs:  Baseline 145, + accels, - decels, moderate variability        [place]: Contractions q 2 minutes, firm to palpation        SVE: LT rim/100/0     A/P:  1.  IUP @ 40w0d            2.  Cat 1 FHTs             3.  Reposition to reduce lip             4.  Anticipate vaginal delivery    Chelita Roper CNM, APRN

## 2019-04-16 NOTE — ANESTHESIA TIME REPORT
Anesthesia Start and Stop Event Times     Date Time Event    4/15/2019 1200 Anesthesia Start     2020 Anesthesia Stop        Responsible Staff  04/15/19    Name Role Begin End    Mark Kaur M.D. Anesth 1200 2020        Preop Diagnosis (Free Text):  Pre-op Diagnosis             Preop Diagnosis (Codes):    Post op Diagnosis  Pregnancy      Premium Reason  A. 3PM - 7AM    Comments:

## 2019-04-16 NOTE — PROGRESS NOTES
[facility] POSTPARTUM PROGRESS NOTE    PATIENT ID:  NAME:  Dee Frederick  MRN:               [id number]  YOB: 1990     28 y.o. female admitted  now  at 40w0d PPD#1 s/p  complicated by vacuum delivery, shoulder dystocia, intrapartum fever, and fetal tachycardia     Subjective: Abdominal pain: yes  Ambulating: yes  Tolerating liquids: yes  Tolerating regular diet: yes  Flatus: no  BM: no  Bleeding: yes, light  Voiding: yes  Dizziness: no  Breast feeding: yes    Objective:    Vitals:    04/15/19 2356 19 0056 19 0200 19 0600   BP:   117/68 134/78   Pulse:   100 76   Resp:   18 18   Temp: 37.7 °C (99.8 °F) 36.3 °C (97.4 °F) 36.8 °C (98.3 °F) 36.6 °C (97.8 °F)   TempSrc: Oral Oral Temporal Temporal   SpO2:   95% 95%   Weight:       Height:         General: No acute distress, resting comfortably in bed.  HEENT: normocephalic, nontraumatic, PERRLA, EOMI  Cardiovascular: Heart RRR with no murmurs, rubs or gallops. Distal Pulses 2+  Respiratory: symmetric chest expansion, lungs CTA bilaterally with no wheezes rales or rhonci  Abdomen: soft, mildly tender, fundus firm, +BS  Genitourinary: lochia light, denies excessive vaginal bleeding  Musculoskeletal: strength 5/5 in four extremities  Neuro: non focal with no numbness, tingling or changes in sensation    Recent Labs      04/15/19   1045  19   0519   WBC  12.7*  19.2*   RBC  3.85*  3.31*   HEMOGLOBIN  12.4  10.6*   HEMATOCRIT  36.2*  31.3*   MCV  94.0  94.6   MCH  32.2  32.0   RDW  50.0  51.0*   PLATELETCT  278  201   MPV  11.0  10.4   NEUTSPOLYS  78.40*   --    LYMPHOCYTES  15.20*   --    MONOCYTES  5.60   --    EOSINOPHILS  0.20   --    BASOPHILS  0.20   --      No results for input(s): SODIUM, POTASSIUM, CHLORIDE, CO2, GLUCOSE, BUN, CPKTOTAL in the last 72 hours.    Current Meds:   Current Facility-Administered Medications   Medication Dose Frequency Provider Last Rate Last Dose   • ibuprofen (MOTRIN) tablet 800 mg   800 mg Q6HRS PRN Juan Carlos Portillo M.D.       • HYDROcodone-acetaminophen (NORCO) 5-325 MG per tablet 1-2 Tab  1-2 Tab Q6HRS PRN Juan Carlos Portillo M.D.       • ropivacaine (NAROPIN) injection   Continuous Mark Kaur M.D.   100 mL at 04/15/19 1213   • oxytocin (PITOCIN) 20 UNITS/1000ML LR (postpartum)   mL/hr Continuous Juan Carlos Portillo M.D.   Stopped at 19 0501   • LR infusion   PRN Juan Carlos Portillo M.D.       • miSOPROStol (CYTOTEC) tablet 600 mcg  600 mcg Once PRN Juan Carlos Portillo M.D.       • PRN oxytocin (PITOCIN) (20 Units/1000 mL) PRN for excessive uterine bleeding - See Admin Instr  125-999 mL/hr Once PRN Juan Carlos Portillo M.D.       • docusate sodium (COLACE) capsule 100 mg  100 mg BID PRN Juan Carlos Portillo M.D.       • magnesium hydroxide (MILK OF MAGNESIA) suspension 30 mL  30 mL Q6HRS PRN Juan Carlos Portillo M.D.       • prenatal plus vitamin (STUARTNATAL 1+1) 27-1 MG tablet 1 Tab  1 Tab QAM Juan Carlos Portillo M.D.         Last reviewed on 4/15/2019 11:55 AM by Evelin Reynolds, RISIDORO.     Assessment:  28 y.o. female admitted  now  at 40w0d PPD#1 s/p  complicated by vacuum delivery, shoulder dystocia, intrapartum fever, and fetal tachycardia       Plan:   1. Afebrile overnight, exam WNL, WBC 19 likely reactive but due to intrapartum fever will monitor closely for signs of infection  2.   Continue routine postpartum care, encourage ambulation, pain control, anticipate D/C home on PPD# 2    Charline Lee M.D.

## 2019-04-17 VITALS
WEIGHT: 215 LBS | TEMPERATURE: 97 F | RESPIRATION RATE: 17 BRPM | SYSTOLIC BLOOD PRESSURE: 133 MMHG | DIASTOLIC BLOOD PRESSURE: 75 MMHG | HEIGHT: 63 IN | BODY MASS INDEX: 38.09 KG/M2 | OXYGEN SATURATION: 98 % | HEART RATE: 73 BPM

## 2019-04-17 PROBLEM — Z34.90 SUPERVISION OF NORMAL PREGNANCY: Status: RESOLVED | Noted: 2018-12-27 | Resolved: 2019-04-17

## 2019-04-17 PROCEDURE — 700112 HCHG RX REV CODE 229: Performed by: OBSTETRICS & GYNECOLOGY

## 2019-04-17 PROCEDURE — A9270 NON-COVERED ITEM OR SERVICE: HCPCS | Performed by: OBSTETRICS & GYNECOLOGY

## 2019-04-17 PROCEDURE — 700102 HCHG RX REV CODE 250 W/ 637 OVERRIDE(OP): Performed by: OBSTETRICS & GYNECOLOGY

## 2019-04-17 RX ORDER — DOCUSATE SODIUM 100 MG/1
100 CAPSULE, LIQUID FILLED ORAL 2 TIMES DAILY
Qty: 60 CAP | Refills: 1 | Status: SHIPPED | OUTPATIENT
Start: 2019-04-17 | End: 2022-05-06

## 2019-04-17 RX ORDER — IBUPROFEN 800 MG/1
800 TABLET ORAL EVERY 8 HOURS PRN
Qty: 30 TAB | Refills: 1 | Status: SHIPPED | OUTPATIENT
Start: 2019-04-17 | End: 2022-05-06

## 2019-04-17 RX ADMIN — IBUPROFEN 800 MG: 800 TABLET, FILM COATED ORAL at 08:07

## 2019-04-17 RX ADMIN — DOCUSATE SODIUM 100 MG: 100 CAPSULE, LIQUID FILLED ORAL at 08:07

## 2019-04-17 RX ADMIN — Medication 1 TABLET: at 08:07

## 2019-04-17 NOTE — DISCHARGE SUMMARY
Discharge Summary:      Dee Frederick      Admit Date:   4/15/2019  Discharge Date:  2019     Admitting diagnosis:  Pregnancy  Labor and delivery, indication for care  Discharge Diagnosis: Status post vacuum extraction   Pregnancy Complications: maternal fever, fetal tachycardia, vacuum assisted delivery, shoulder dystocia.   Tubal Ligation:  no        History:  History reviewed. No pertinent past medical history.  OB History    Para Term  AB Living   2 2 2 0 0 2   SAB TAB Ectopic Molar Multiple Live Births   0 0 0 0 0 2      # Outcome Date GA Lbr Basil/2nd Weight Sex Delivery Anes PTL Lv   2 Term 04/15/19 40w0d  4.802 kg (10 lb 9.4 oz) M Vag-Vacuum EPI N ANTHONY   1 Term 03/19/10 41w0d  3.884 kg (8 lb 9 oz) M Induction   ANTHONY           Patient has no known allergies.  There are no active problems to display for this patient.       Hospital Course:   28 y.o. , now para 2, was admitted with the above mentioned diagnosis, underwent Active Labor, vacuum extraction. Patient postpartum course was unremarkable, with progressive advancement in diet , ambulation and toleration of oral analgesia. Patient without complaints today and desires discharge.      Vitals:    19 0200 19 0600 19 1800 19 0600   BP: 117/68 134/78 118/74 133/75   Pulse: 100 76 97 73   Resp: 18 18 20 17   Temp: 36.8 °C (98.3 °F) 36.6 °C (97.8 °F) 36.2 °C (97.2 °F) 36.1 °C (97 °F)   TempSrc: Temporal Temporal Temporal Temporal   SpO2: 95% 95% 98% 98%   Weight:       Height:           Current Facility-Administered Medications   Medication Dose   • ibuprofen (MOTRIN) tablet 800 mg  800 mg   • HYDROcodone-acetaminophen (NORCO) 5-325 MG per tablet 1-2 Tab  1-2 Tab   • ropivacaine (NAROPIN) injection     • oxytocin (PITOCIN) 20 UNITS/1000ML LR (postpartum)   mL/hr   • LR infusion     • miSOPROStol (CYTOTEC) tablet 600 mcg  600 mcg   • PRN oxytocin (PITOCIN) (20 Units/1000 mL) PRN for excessive uterine bleeding  - See Admin Instr  125-999 mL/hr   • docusate sodium (COLACE) capsule 100 mg  100 mg   • magnesium hydroxide (MILK OF MAGNESIA) suspension 30 mL  30 mL   • prenatal plus vitamin (STUARTNATAL 1+1) 27-1 MG tablet 1 Tab  1 Tab       Exam:  Breast Exam: negative  Abdomen: Abdomen soft, non-tender. BS normal. No masses,  No organomegaly  Fundus Non Tender: yes  Incision: none  Perineum: perineum intact  Extremity: extremities, peripheral pulses and reflexes normal     Labs:  Recent Labs      04/15/19   1045  04/16/19   0519   WBC  12.7*  19.2*   RBC  3.85*  3.31*   HEMOGLOBIN  12.4  10.6*   HEMATOCRIT  36.2*  31.3*   MCV  94.0  94.6   MCH  32.2  32.0   MCHC  34.3  33.9   RDW  50.0  51.0*   PLATELETCT  278  201   MPV  11.0  10.4        Activity:   Discharge to home  Pelvic Rest x 6 weeks    Assessment:  normal postpartum course  Discharge Assessment: No areas of skin breakdown/redness; surgical incision intact/healing, Taking adequate diet and fluids, No heavy bleeding or foul vaginal discharge      Follow up: [facility] or [facility] in 5 weeks for vaginal; 1 week for incision check.   To resume daily PNV and iron supplement if needed with hydration.   Patient to RT [facility] or ER if any of the following occur:  Fever over 100.5  Severe abdominal pain  Red streaks or painful masses in the breasts  Foul smelling discharge or lochia  Heavy vaginal bleeding saturating a pad per hour  S/s of PP depression     Discharge Meds:   Current Outpatient Prescriptions   Medication Sig Dispense Refill   • docusate sodium (COLACE) 100 MG Cap Take 1 Cap by mouth 2 times a day. 60 Cap 1   • ibuprofen (MOTRIN) 800 MG Tab Take 1 Tab by mouth every 8 hours as needed. 30 Tab 1       Charline Lee M.D.

## 2019-04-17 NOTE — LACTATION NOTE
Followup visit. MOB preparing for d/c.MOB reports baby breastfeeding well, she denies nipple pain or tenderness. Reviewed normal feeding frequency for next 3 days and stool changes to expect. Encouraged to continue to feed on cue without time restrictions. Reviewed post d/c bfdg resources. Encouraged to attend bfdg circles for ongoing support.

## 2019-04-17 NOTE — DISCHARGE INSTRUCTIONS
POSTPARTUM DISCHARGE INSTRUCTIONS FOR MOM    YOB: 1990   Age: 28 y.o.               Admit Date: 4/15/2019     Discharge Date: 2019  Attending Doctor:  Jair Khalil M.D.                  Allergies:  Patient has no known allergies.    Discharged to home by car. Discharged via wheelchair, hospital escort: Yes.  Special equipment needed: Not Applicable  Belongings with: Personal  Be sure to schedule a follow-up appointment with your primary care doctor or any specialists as instructed.     Discharge Plan:   Diet Plan: Discussed  Activity Level: Discussed  Confirmed Follow up Appointment: Patient to Call and Schedule Appointment  Confirmed Symptoms Management: Discussed  Medication Reconciliation Updated: Yes  Influenza Vaccine Indication: Not indicated: Previously immunized this influenza season and > 8 years of age    REASONS TO CALL YOUR OBSTETRICIAN:  1.   Persistent fever or shaking chills (Temperature higher than 100.4)  2.   Heavy bleeding (soaking more than 1 pad per hour); Passing clots  3.   Foul odor from vagina  4.   Mastitis (Breast infection; breast pain, chills, fever, redness)  5.   Urinary pain, burning or frequency  6.   Episiotomy infection  7.   Abdominal incision infection  8.   Severe depression longer than 24 hours    HAND WASHING  · Prior to handling the baby.  · Before breastfeeding or bottle feeding baby.  · After using the bathroom or changing the baby's diaper.    WOUND CARE  Ask your physician for additional care instructions.  In general:    ·  Incision:      · Keep clean and dry.    · Do NOT lift anything heavier than your baby for up to 6 weeks.    · There should not be any opening or pus.      VAGINAL CARE  · Nothing inside vagina for 6 weeks: no sexual intercourse, tampons or douching.  · Bleeding may continue for 2-4 weeks.  Amount may vary.    · Call your physician for heavy bleeding which means soaking more than 1 pad per hour    BIRTH CONTROL  · It is  "possible to become pregnant at any time after delivery and while breastfeeding.  · Plan to discuss a method of birth control with your physician at your follow up visit. visit.    DIET AND ELIMINATION  · Eating more fiber (bran cereal, fruits, and vegetables) and drinking plenty of fluids will help to avoid constipation.  · Urinary frequency after childbirth is normal.    POSTPARTUM BLUES  During the first few days after birth, you may experience a sense of the \"blues\" which may include impatience, irritability or even crying.  These feeling come and go quickly.  However, as many as 1 in 10 women experience emotional symptoms known as postpartum depression.    Postpartum depression:  May start as early as the second or third day after delivery or take several weeks or months to develop.  Symptoms of \"blues\" are present, but are more intense:  Crying spells; loss of appetite; feelings of hopelessness or loss of control; fear of touching the baby; over concern or no concern at all about the baby; little or no concern about your own appearance/caring for yourself; and/or inability to sleep or excessive sleeping.  Contact your physician if you are experiencing any of these symptoms.    Crisis Hotline:  · [place] Crisis Hotline:  5-967-OKMFTTQ  Or 1-822.904.8942  · [place] Crisis Hotline:  1-721.782.5493  Or 398-160-8864    PREVENTING SHAKEN BABY:  If you are angry or stressed, PUT THE BABY IN THE CRIB, step away, take some deep breaths, and wait until you are calm to care for the baby.  DO NOT SHAKE THE BABY.  You are not alone, call a supporter for help.    · Crisis Call Center 24/7 crisis line 279-362-4330 or 1-387.667.3314  · You can also text them, text \"ANSWER\" to 277078    QUIT SMOKING/TOBACCO USE:  I understand the use of any tobacco products increases my chance of suffering from future heart disease and could cause other illnesses which may shorten my life. Quitting the use of tobacco products is the single most " important thing I can do to improve my health. For further information on smoking / tobacco cessation call a Toll Free Quit Line at 1-670.282.2134 (*[employer] [place]) or 1-620.275.6344 (American Lung Association) or you can access the web based program at www.lungusa.org.    · [place] Tobacco Users Help Line:  (733) 575-8370       Toll Free: 1-330.593.8469  · Quit Tobacco Program [facility] (800)770-4262    DEPRESSION / SUICIDE RISK:  As you are discharged from this [facility], it is important to learn how to keep safe from harming yourself.    Recognize the warning signs:  · Abrupt changes in personality, positive or negative- including increase in energy   · Giving away possessions  · Change in eating patterns- significant weight changes-  positive or negative  · Change in sleeping patterns- unable to sleep or sleeping all the time   · Unwillingness or inability to communicate  · Depression  · Unusual sadness, discouragement and loneliness  · Talk of wanting to die  · Neglect of personal appearance   · Rebelliousness- reckless behavior  · Withdrawal from people/activities they love  · Confusion- inability to concentrate     If you or a loved one observes any of these behaviors or has concerns about self-harm, here's what you can do:  · Talk about it- your feelings and reasons for harming yourself  · Remove any means that you might use to hurt yourself (examples: pills, rope, extension cords, firearm)  · Get professional help from the community (Mental Health, Substance Abuse, psychological counseling)  · Do not be alone:Call your Safe Contact- someone whom you trust who will be there for you.  · Call your local CRISIS HOTLINE 045-2201 or 583-607-7999  · Call your local Children's Mobile Crisis Response Team [place] (640) 501-3011 or www.[employer]  · Call the toll free National Suicide Prevention Hotlines   · National Suicide Prevention Lifeline 306-015-GBVV  (6230)  · [facility] 800-SUICIDE (376-5215)    DISCHARGE SURVEY:  Thank you for choosing [facility].  We hope we provided you with very good care.  You may be receiving a survey in the mail.  Please fill it out.  Your opinion is valuable to us.    ADDITIONAL EDUCATIONAL MATERIALS GIVEN TO PATIENT:    To resume daily prenatal and iron supplement if needed with hydration.   Patient to return to the pregnancy center or Emergency Department if any of the following occur:  Fever over 100.5  Severe abdominal pain  Red streaks or painful masses in the breasts  Foul smelling discharge or lochia  Heavy vaginal bleeding saturating a pad per hour  Signs/symptoms of postpartum depression       My signature on this form indicates that:  1.  I have reviewed and understand the above information  2.  My questions regarding this information have been answered to my satisfaction.  3.  I have formulated a plan with my discharge nurse to obtain my prescribed medication for home.

## 2019-04-17 NOTE — CARE PLAN
Problem: Venous Thromboembolism (VTW)/Deep Vein Thrombosis (DVT) Prevention:  Goal: Patient will participate in Venous Thrombosis (VTE)/Deep Vein Thrombosis (DVT)Prevention Measures  Outcome: PROGRESSING AS EXPECTED  No current s/s of DVT. Pt ambulatory.     Problem: Altered physiologic condition related to immediate post-delivery state and potential for bleeding/hemorrhage  Goal: Patient physiologically stable as evidenced by normal lochia, palpable uterine involution and vital signs within normal limits  Outcome: PROGRESSING AS EXPECTED  Fundus firm, lochia light.

## 2019-04-17 NOTE — PROGRESS NOTES
Pt discharged at approximately 1618 via wheelchair with hospital escort. Infant placed in car seat by parent, and checked by RN.  Pt discharge instructions provided at approximately 1600 prescriptions given to patient. Checked armbands. Clamp and cuddles removed. No further questions at this time.

## 2019-05-13 ENCOUNTER — OFFICE VISIT (OUTPATIENT)
Dept: URGENT CARE | Facility: PHYSICIAN GROUP | Age: 29
End: 2019-05-13
Payer: COMMERCIAL

## 2019-05-13 VITALS
TEMPERATURE: 100.6 F | OXYGEN SATURATION: 97 % | WEIGHT: 180 LBS | RESPIRATION RATE: 16 BRPM | BODY MASS INDEX: 28.93 KG/M2 | DIASTOLIC BLOOD PRESSURE: 84 MMHG | SYSTOLIC BLOOD PRESSURE: 116 MMHG | HEIGHT: 66 IN | HEART RATE: 110 BPM

## 2019-05-13 DIAGNOSIS — N61.0 MASTITIS: Primary | ICD-10-CM

## 2019-05-13 PROCEDURE — 99214 OFFICE O/P EST MOD 30 MIN: CPT | Performed by: NURSE PRACTITIONER

## 2019-05-13 RX ORDER — CEPHALEXIN 500 MG/1
500 CAPSULE ORAL 4 TIMES DAILY
Qty: 28 CAP | Refills: 0 | Status: SHIPPED | OUTPATIENT
Start: 2019-05-13 | End: 2019-05-20

## 2019-05-14 NOTE — PROGRESS NOTES
"Subjective:     Dee Frederick is a 28 y.o. female who presents for Fever (bodyaches, fevers starting last night, breastfeeding)       Breast Problem   This is a new problem. The problem has been gradually worsening. Associated symptoms include chills, a fever and myalgias. Pertinent negatives include no coughing or urinary symptoms.     Patient reporting chills, fevers, body aches starting last night.  Also reports that her breasts have been feeling tender, warm, and swollen, more so on the left breast. Reports that she has been breast-feeding her infant for about 1 month.    PMH:  has no past medical history on file.    MEDS:   Current Outpatient Prescriptions:   •  cephALEXin (KEFLEX) 500 MG Cap, Take 1 Cap by mouth 4 times a day for 7 days., Disp: 28 Cap, Rfl: 0  •  ibuprofen (MOTRIN) 800 MG Tab, Take 1 Tab by mouth every 8 hours as needed., Disp: 30 Tab, Rfl: 1  •  Prenatal Vit-Fe Fumarate-FA (PRENATAL 1+1 PO), Take  by mouth., Disp: , Rfl:   •  docusate sodium (COLACE) 100 MG Cap, Take 1 Cap by mouth 2 times a day. (Patient not taking: Reported on 5/13/2019), Disp: 60 Cap, Rfl: 1    ALLERGIES: No Known Allergies    SURGHX: No past surgical history on file.    SOCHX:  reports that she has never smoked. She has never used smokeless tobacco. She reports that she does not drink alcohol or use drugs.     FH: Reviewed with patient, not pertinent to this visit.     Review of Systems   Constitutional: Positive for chills and fever.   Respiratory: Negative.  Negative for cough.    Cardiovascular: Negative.    Gastrointestinal: Negative.    Musculoskeletal: Positive for myalgias.   Neurological: Negative.    All other systems reviewed and are negative.    Objective:     /84 (BP Location: Left arm, Patient Position: Sitting, BP Cuff Size: Small adult)   Pulse (!) 110   Temp (!) 38.1 °C (100.6 °F) (Temporal)   Resp 16   Ht 1.676 m (5' 6\")   Wt 81.6 kg (180 lb)   SpO2 97%   BMI 29.05 kg/m²     Physical Exam "   Constitutional: She is oriented to person, place, and time. She appears well-developed and well-nourished. She is cooperative.  Non-toxic appearance. No distress.   HENT:   Right Ear: External ear normal.   Left Ear: External ear normal.   Nose: Nose normal.   Mouth/Throat: Mucous membranes are normal.   Eyes: Conjunctivae and EOM are normal.   Neck: Normal range of motion.   Cardiovascular: Regular rhythm, normal heart sounds and normal pulses.  Tachycardia present.    Pulmonary/Chest: Effort normal and breath sounds normal. No respiratory distress. She has no decreased breath sounds. There is breast swelling (mild).   Abdominal: Soft. Bowel sounds are normal. There is no tenderness.   Genitourinary: There is breast tenderness (mild). No breast discharge.   Musculoskeletal: Normal range of motion. She exhibits no deformity.   Neurological: She is alert and oriented to person, place, and time. She has normal strength. No sensory deficit.   Skin: Skin is warm, dry and intact. Capillary refill takes less than 2 seconds.   Psychiatric: She has a normal mood and affect. Her behavior is normal.   Vitals reviewed.       Assessment/Plan:     1. Mastitis  - cephALEXin (KEFLEX) 500 MG Cap; Take 1 Cap by mouth 4 times a day for 7 days.  Dispense: 28 Cap; Refill: 0    , 100.6 F temp. Rx sent electronically for Keflex. Discussed close monitoring and supportive measures including increasing fluids and rest as well as OTC symptom management including acetaminophen and/or ibuprofen PRN pain and/or fever. Warning signs reviewed.    Patient advised to: Return for 1) Symptoms don't improve or worsen, or go to ER, 2) Follow up with primary care in 7-10 days.    Differential diagnosis, natural history, supportive care, and indications for immediate follow-up discussed. All questions answered. Patient agrees with the plan of care.

## 2019-05-15 ASSESSMENT — ENCOUNTER SYMPTOMS
NEUROLOGICAL NEGATIVE: 1
CARDIOVASCULAR NEGATIVE: 1
FEVER: 1
GASTROINTESTINAL NEGATIVE: 1
RESPIRATORY NEGATIVE: 1
MYALGIAS: 1
CHILLS: 1
COUGH: 0

## 2019-05-29 ENCOUNTER — POST PARTUM (OUTPATIENT)
Dept: OBGYN | Facility: MEDICAL CENTER | Age: 29
End: 2019-05-29
Payer: COMMERCIAL

## 2019-05-29 VITALS — WEIGHT: 181 LBS | BODY MASS INDEX: 29.21 KG/M2 | DIASTOLIC BLOOD PRESSURE: 70 MMHG | SYSTOLIC BLOOD PRESSURE: 110 MMHG

## 2019-05-29 PROCEDURE — 90050 PR POSTPARTUM VISIT: CPT | Performed by: NURSE PRACTITIONER

## 2019-05-29 NOTE — PROGRESS NOTES
Pt here today for postpartum exam.  Delivery Date and Type 04/15 vaginal  Currently: breast  BCM[facility]   Mood. good  LMP: N/A  Good ph:[phone]

## 2019-05-29 NOTE — PROGRESS NOTES
Subjective:      Dee Frederick is a 28 y.o. female who presents with No chief complaint on file.            HPI    ROS       Objective:     /70   Wt 82.1 kg (181 lb)   BMI 29.21 kg/m²      Physical Exam   Constitutional: She appears well-developed and well-nourished.   HENT:   Head: Normocephalic.   Eyes: Pupils are equal, round, and reactive to light.   Neck: Normal range of motion. No thyromegaly present.   Cardiovascular: Normal rate, regular rhythm and normal heart sounds.    Pulmonary/Chest: Effort normal and breath sounds normal.   Abdominal: Soft. Bowel sounds are normal.   Genitourinary: Vagina normal and uterus normal.   Neurological: She is alert.   Skin: Skin is warm and dry.   Psychiatric: She has a normal mood and affect. Her behavior is normal. Judgment and thought content normal.   Nursing note and vitals reviewed.              Assessment/Plan:     There are no diagnoses linked to this encounter.

## 2019-05-29 NOTE — PROGRESS NOTES
Subjective   Subjective:    Dee Frederick is a 28 y.o. female who presents for her postpartum exam s/p VAVD with bilateral labial lacerations. She was admitted for active labor. Her prenatal course was uncomplicated. Her postpartum course was complicated by mastitis left breast for which she went to urgent care. She denies dysuria, vaginal bleeding, odor, itching or breast problems. She is breast feeding. She desires a nexplanon for her birth control method. Reports not having sex prior to this appointment. Eating a regular diet without difficulty. Bowel movement are Normal.  The patient is not having any pain. No current menses now. Patient Denies Incisional pain, drainage or redness. Patient denies postpartum depression although sleep can be poor due to new baby.     Problem List     There are no active problems to display for this patient.      Objective    See PE  Lab: H&H upon discharge 10.6/31.3  Weight - 181 lb  Vitals - 110/70    Assessment   Assessment:    1. PP care of lactating women   2. Exam WNL   3. Pap WNL on 11/18  4. Desires contraception - nexplanon    Plan   Plan:    1. Breastfeeding support try to feed on left side first to avoid milk stasis.   2. Continue PNV   3. Contraceptive counseling - Patient consented for nexplanon, paperwork signed. Gyn order placed  4. Encouraged condom use for contraceptive start up and std protection if needed  5. Discussed diet, exercise and resumption of sexual activity   6. Preconception guidance for next pregnancy if applicable. Previous VAVD risk factors, however it was for fetal heart tones down. Folic acid for all women of childbearing age.   7.  Follow up needed - continue annual gyn.  8.  Smoking/etoh/drug screening - no exposure

## 2019-06-25 ENCOUNTER — TELEPHONE (OUTPATIENT)
Dept: OBGYN | Facility: MEDICAL CENTER | Age: 29
End: 2019-06-25

## 2019-06-25 NOTE — TELEPHONE ENCOUNTER
Tiffanie from [facility] called to set up delivery for pt's Nexplanon. Call [phone]     Nexplanon will be arriving on 6/28/19.  Not further questions.

## 2019-06-27 ENCOUNTER — TELEPHONE (OUTPATIENT)
Dept: OBGYN | Facility: CLINIC | Age: 29
End: 2019-06-27

## 2019-06-27 NOTE — TELEPHONE ENCOUNTER
Pt called states she hasn't receive a call from us to get her Nexplanon.  Was informed that device hasn't arrive, ones we get the device she will receive a call to schedule.  Per Fely device will be arriving on 6/28/19.    Pt expect a call next week to schedule

## 2019-07-01 ENCOUNTER — GYNECOLOGY VISIT (OUTPATIENT)
Dept: OBGYN | Facility: MEDICAL CENTER | Age: 29
End: 2019-07-01
Payer: COMMERCIAL

## 2019-07-01 VITALS — WEIGHT: 177 LBS | BODY MASS INDEX: 28.57 KG/M2 | SYSTOLIC BLOOD PRESSURE: 110 MMHG | DIASTOLIC BLOOD PRESSURE: 64 MMHG

## 2019-07-01 DIAGNOSIS — Z30.017 NEXPLANON INSERTION: ICD-10-CM

## 2019-07-01 NOTE — PROGRESS NOTES
Dee Frederick is a 28 y.o. female who presents for Nexplanon insertion        [facility] Comments: Pt presents for Nexplanon insertion.  Patient's last menstrual period was 06/05/2019.  .  Review of Systems   Pertinent positives documented in HPI and all other systems reviewed & are negative      History reviewed. No pertinent past medical history.    Medications:   Current Outpatient Prescriptions Ordered in [facility]   Medication Sig Dispense Refill   • docusate sodium (COLACE) 100 MG Cap Take 1 Cap by mouth 2 times a day. (Patient not taking: Reported on 5/13/2019) 60 Cap 1   • ibuprofen (MOTRIN) 800 MG Tab Take 1 Tab by mouth every 8 hours as needed. (Patient not taking: Reported on 5/29/2019) 30 Tab 1   • Prenatal Vit-Fe Fumarate-FA (PRENATAL 1+1 PO) Take  by mouth.       No current Epic-ordered facility-administered medications on file.          Objective  Well nourished female in no acute distress  A& O x 3  Respirations even and non labored.  Skin is moist, without erythema or rashes  Patient with normal mood and affect; good insight and judgement      Procedure note: Nexplanon insertion    All risks, benefits and potential side effects of the Nexplanon are discussed at length  Risks to include: Implant migrating, implant breaking in the arm, infection at the insertion/removal site, difficult removal of the device.  Side effects: irregular unpredictable bleeding, acne, mood changes, weight changes, headaches, dizziness  Benefits: most women (about 70%) have very little or no bleeding on the implant, decrease in cramping, strong reliable birth control at 99.6 % effective, private, totally reversible long term birth control method of 3 years   She verbalizes consent.        Urine hCG negative    Patient positioned supine with nondominant arm exposed.    Medial epicondyle of left arm palpated    Surgical opal placed 8-10 cm medial to the medial epicondyle    Betadine prep the skin    Local anesthesia-1% lidocaine  injected using a 1 1/2 inch 27 gauge needle     Implant inserted via the Nexplanon insertion device subdermally in a sterile fashion    The patient and provider can feel the implant under the skin and the blue end of the insertion device is present indicating implant insertion    Steristrip and sterile 4x4's     Pressure bandage placed    Patient instructed to remove dressing  in 24 hours    Patient instructed to use a band-aid for 2 days there after    Patient tolerated the procedure well    Lot # [id number]  Exp: 10/2021    Patient given Postoperative Advice  Take NSAIDS and tylenol for pain, ice packs prn bruising/discomfort  Remove gauze wrap after 24 hrs, or at anytime it becomes uncomfortable   Steri Strips to be removed at 3-4 days  RTC as needed

## 2019-07-08 ENCOUNTER — TELEPHONE (OUTPATIENT)
Dept: SCHEDULING | Facility: IMAGING CENTER | Age: 29
End: 2019-07-08

## 2019-09-20 ENCOUNTER — NON-PROVIDER VISIT (OUTPATIENT)
Dept: URGENT CARE | Facility: PHYSICIAN GROUP | Age: 29
End: 2019-09-20

## 2019-09-20 DIAGNOSIS — Z02.1 PRE-EMPLOYMENT DRUG SCREENING: ICD-10-CM

## 2019-09-20 LAB
AMP AMPHETAMINE: NORMAL
COC COCAINE: NORMAL
INT CON NEG: NORMAL
INT CON POS: NORMAL
MET METHAMPHETAMINES: NORMAL
OPI OPIATES: NORMAL
PCP PHENCYCLIDINE: NORMAL
POC DRUG COMMENT 753798-OCCUPATIONAL HEALTH: NEGATIVE
THC: NORMAL

## 2019-09-20 PROCEDURE — 80305 DRUG TEST PRSMV DIR OPT OBS: CPT | Performed by: FAMILY MEDICINE

## 2022-05-06 ENCOUNTER — GYNECOLOGY VISIT (OUTPATIENT)
Dept: OBGYN | Facility: CLINIC | Age: 32
End: 2022-05-06
Payer: COMMERCIAL

## 2022-05-06 VITALS
BODY MASS INDEX: 27.44 KG/M2 | DIASTOLIC BLOOD PRESSURE: 72 MMHG | WEIGHT: 170 LBS | SYSTOLIC BLOOD PRESSURE: 112 MMHG | HEART RATE: 82 BPM | RESPIRATION RATE: 18 BRPM

## 2022-05-06 DIAGNOSIS — Z30.46 ENCOUNTER FOR REMOVAL AND REINSERTION OF ETONOGESTREL IMPLANT: ICD-10-CM

## 2022-05-06 PROCEDURE — 11983 REMOVE/INSERT DRUG IMPLANT: CPT | Performed by: ADVANCED PRACTICE MIDWIFE

## 2022-05-06 NOTE — PROGRESS NOTES
Dee Frederick is a 31 y.o. female who presents for nexplanon insertion and removal.        HPI Comments: Pt presents for reomval and reinsertions.  Patient's last menstrual period was 04/15/2022. She reports she has menses monthly and has for duration of nexplanon use.   .  Review of Systems   Pertinent positives documented in HPI and all other systems reviewed & are negative      No past medical history on file.    Medications:   Current Outpatient Medications Ordered in Epic   Medication Sig Dispense Refill   • Prenatal Vit-Fe Fumarate-FA (PRENATAL 1+1 PO) Take  by mouth.       Current Facility-Administered Medications Ordered in Epic   Medication Dose Route Frequency Provider Last Rate Last Admin   • etonogestrel (NEXPLANON) implant 1 Each  1 Each Subcutaneous Once EMMIE Nicolas           Physical Exam   Nursing note and vitals reviewed.  Constitutional: She is oriented to person, place, and time. She appears well-developed and well-nourished. No distress.     Musculoskeletal: Normal range of motion. She exhibits no edema and no tenderness.     Skin: Skin is warm and dry. No rash noted. She is not diaphoretic. No erythema. No pallor.     Psychiatric: She has a normal mood and affect. Her behavior is normal. Judgment and thought content normal.     Procedure note: Nexplanon removal and reinsertion      After consent obtained, Nexplanon was identified in patient left upper arm through palpation. Site was marked and allergies verified. Betadine used x 3 to clean marked area. 3 ml of 2% lidocaine was injected at the marked location which was the distal end of the Nexplanon. Two minutes was observed to allow appropriate anesthesia. Site was tested with curved hemostat and found to have adequate anesthesia. Site was again cleaned with betadine x 1 and blotted dry with sterile gauze. Using 10 blade, incision was made parallel to distal end of the Nexplanon. With gentle manipulation, the distal end of the  Nexplanon was revealed. Curved hemostats were used to grasp this and Nexplanon was removed intact from patient arm. Verified intact by patient and discarded.     All risks, benefits and potential side effects of the Nexplanon are discussed at length.  Risks to include: Implant migrating, implant breaking in the arm, infection at the insertion/removal site, difficult removal of the device.  Side effects: irregular unpredictable bleeding, acne, mood changes, weight changes, headaches, dizziness  Benefits: most women (about 70%) have very little or no bleeding on the implant, decrease in cramping, strong reliable birth control at 99.6 % effective, private, totally reversible long term birth control method of 3 years   She verbalizes consent.    Implant inserted via the Nexplanon insertion device subdermally in a sterile fashion at previous site of removal.    The patient and provider can feel the implant under the skin and the blue end of the insertion device is present indicating implant insertion    Steristrip and sterile 4x4's     Pressure bandage placed    Patient instructed to remove dressing  in 24 hours    Patient instructed to use a band-aid for 2 days there after    Patient tolerated the procedure well      Steristrip was placed at the site and pressure dressing applied. EBL 5ml. She tolerated procedure well.

## 2023-03-27 NOTE — NON-PROVIDER
Pt is here for a nexplannio    Topical Retinoid counseling:  Patient advised to apply a pea-sized amount only at bedtime and wait 30 minutes after washing their face before applying.  If too drying, patient may add a non-comedogenic moisturizer. The patient verbalized understanding of the proper use and possible adverse effects of retinoids.  All of the patient's questions and concerns were addressed.